# Patient Record
Sex: FEMALE | Race: WHITE | NOT HISPANIC OR LATINO | Employment: OTHER | URBAN - METROPOLITAN AREA
[De-identification: names, ages, dates, MRNs, and addresses within clinical notes are randomized per-mention and may not be internally consistent; named-entity substitution may affect disease eponyms.]

---

## 2017-01-04 ENCOUNTER — OFFICE VISIT (OUTPATIENT)
Dept: UROGYNECOLOGY | Facility: CLINIC | Age: 82
End: 2017-01-04
Payer: MEDICARE

## 2017-01-04 ENCOUNTER — LAB VISIT (OUTPATIENT)
Dept: LAB | Facility: OTHER | Age: 82
End: 2017-01-04
Attending: OBSTETRICS & GYNECOLOGY
Payer: MEDICARE

## 2017-01-04 VITALS
BODY MASS INDEX: 26.71 KG/M2 | DIASTOLIC BLOOD PRESSURE: 60 MMHG | WEIGHT: 132.5 LBS | SYSTOLIC BLOOD PRESSURE: 142 MMHG | HEIGHT: 59 IN | RESPIRATION RATE: 18 BRPM | HEART RATE: 78 BPM

## 2017-01-04 DIAGNOSIS — N95.2 VAGINAL ATROPHY: ICD-10-CM

## 2017-01-04 DIAGNOSIS — N39.46 URINARY INCONTINENCE, MIXED: ICD-10-CM

## 2017-01-04 DIAGNOSIS — E03.9 ACQUIRED HYPOTHYROIDISM: Primary | ICD-10-CM

## 2017-01-04 DIAGNOSIS — R35.1 NOCTURIA MORE THAN TWICE PER NIGHT: ICD-10-CM

## 2017-01-04 DIAGNOSIS — E03.9 ACQUIRED HYPOTHYROIDISM: ICD-10-CM

## 2017-01-04 DIAGNOSIS — R26.89 BALANCE DISORDER: ICD-10-CM

## 2017-01-04 DIAGNOSIS — N99.3 VAGINAL VAULT PROLAPSE, POSTHYSTERECTOMY: ICD-10-CM

## 2017-01-04 LAB
T3FREE SERPL-MCNC: 2.8 PG/ML
T4 FREE SERPL-MCNC: 0.83 NG/DL
TSH SERPL DL<=0.005 MIU/L-ACNC: 0.09 UIU/ML

## 2017-01-04 PROCEDURE — 1159F MED LIST DOCD IN RCRD: CPT | Mod: S$GLB,,, | Performed by: OBSTETRICS & GYNECOLOGY

## 2017-01-04 PROCEDURE — 1160F RVW MEDS BY RX/DR IN RCRD: CPT | Mod: S$GLB,,, | Performed by: OBSTETRICS & GYNECOLOGY

## 2017-01-04 PROCEDURE — 84443 ASSAY THYROID STIM HORMONE: CPT

## 2017-01-04 PROCEDURE — 99213 OFFICE O/P EST LOW 20 MIN: CPT | Mod: S$GLB,,, | Performed by: OBSTETRICS & GYNECOLOGY

## 2017-01-04 PROCEDURE — 84481 FREE ASSAY (FT-3): CPT

## 2017-01-04 PROCEDURE — 99999 PR PBB SHADOW E&M-EST. PATIENT-LVL III: CPT | Mod: PBBFAC,,, | Performed by: OBSTETRICS & GYNECOLOGY

## 2017-01-04 PROCEDURE — 84439 ASSAY OF FREE THYROXINE: CPT

## 2017-01-04 PROCEDURE — 1157F ADVNC CARE PLAN IN RCRD: CPT | Mod: S$GLB,,, | Performed by: OBSTETRICS & GYNECOLOGY

## 2017-01-04 PROCEDURE — 1126F AMNT PAIN NOTED NONE PRSNT: CPT | Mod: S$GLB,,, | Performed by: OBSTETRICS & GYNECOLOGY

## 2017-01-04 PROCEDURE — 36415 COLL VENOUS BLD VENIPUNCTURE: CPT

## 2017-01-04 NOTE — PATIENT INSTRUCTIONS
1) Vaginal dryness: Use REPLENS or REFRESH. Insert 1/2 applicator full into vagina at least twice a week. Also apply small amount to vaginal opening, farnaz back side near rectum. Before next change, use replens/rephresh/or coconut oil EVERY night x 1 week to improve tissue pliability for pessary check.    2) Weight loss: Use lidocaine 2% gel before next pessary change. #5 ring + support. Please  (Majoria) and bring lidocaine gel with you to all visits.   3) Nocturia (nighttime urination):   --continue Kegel exercises  --stop fluids 2 hours before bedtime; minimize fluids consumed during night (only keep small water by bed for pills)  --elevation of feet above heart x1 hour before bed   --use of support hose during daytime   --drink any alcohol by 7 PM  --Benadryl QHS PRN   --given list of dietary irritants to consider   --continue thyroid supplement  4) Well-woman care:   --pap:  Post-hyst.  Denies history of abnormal Pap smear. Last Pap smear, 2009, negative. No further needed.   --MMG: The patient has never had a mammogram. She continues to refuse and understands the risks of doing so.   --colonoscopy:  She does not desire annual screening including colonoscopy. She understands the risks of doing so.    --DEXA: unknown date  --follow up with PCP on JONO Daley  5)  Constipation:  --continue thyroid supplement  --start daily gummy fibers + 1 stool softener/day; continue daily prunes  --hydrate well  --continue magnesium oxide daily (400 IU daily or through other supplements)  --fiber can help reduce blood lipid levels  6)  Pelvic organ prolapse (cystocele/rectocele):  --continue with pessary use   7)   Thyroid disorder:  --TSH, T4, T3  --endocrine consult  8)   Worsening balance issues, h/o seizures:  --neurology consult  9)  RTC 3 months.

## 2017-01-04 NOTE — PROGRESS NOTES
"CC: pelvic organ prolapse, mild stress incontinence    HPI: 81 y/o WDWN, ELMO with history of vaginal prolapse presents for pessary cleaning. She has worn the pessary for past year without complaints. She reports that the pessary fits much better since her last visit. She denies difficulty with comfort, voiding, or defecating. She reports no vaginal discharge or bleeding with pessary. States no difficulty emptying bladder. Voids 6/day, nocturia 3-4/night, urgency if waits too long to void1-2/day. Wears 1-2 pads/day. States occasional leakage with cough/sneeze- has stopped. Denies pain with urination. States occasional constipation, improved since last visit. She is taking a new supplement for the last 2-3 months for "low thyroid" which she feels like may be helping with constipation. She reports a history of "low thyroid" diagnosis many years ago. She used to take medication for this, but reports she stopped the medication as it did not seem to help. She reports increased hair loss recently. She reports feeling tired/fatigued prior to starting these supplements. She also reports intolerance to cold. Has not had labs recently. Reports that increased fiber in diet has helped with constipation as well. She does not use stool softener.     2008:   PROCEDURES PERFORMED:   1. Perigee procedure (attached to obturator and ischial spine).    2. Anterior repair with graft, Prolift propylene and the MiniArc.   3. Cystoscopy.       1)  BRAD:  Not bad currently.  Mostly bladder pressure at night--feels sense of urgency.  Also notes some urgency during day, farnaz when out.  3-4x/PM.  In day, can hold urine ok.  +complete emptying.  No dysuria. Nocturia: 3x/PM.    2)  Vaginal atrophy: not using replens 2x./week on a regular basis  3)  POP:  Pessary ok.  No vb, discharge, bleeding.   4)  BM: +constipation.  Sprinkles fiber on food/gummies erratically.      ----------------------------    Was seeing Dr. Orellana for pessary changes " "(geographic convenience). Last visit 4/13:  Lena Driscoll is a 81 y.o. female who presents for evaluation of a vaginal vault prolapse following hysterectomy. Problem started several years ago. Symptoms include: prolapse of tissue with straining and urinary incontinence: moderate. Symptoms have stabilized with use of #3 ring pessary with support. Unfortunately the pessary has recently been coming out when she strains to defecate and she needs to be refitted, but she has trouble tolerating a larger size pessary.  PLAN: Discussed pessary and Refitted with #2 Ring with Knob and with support.  Advised on stool softeners, avoiding constipation and avoiding straining.    -------------------------------------------    Spoke with patient. Saw Dr. Orellana for her pessary check. Was informed that her pessary was rubbing. At my last visit 4/15:  --Weight loss: Use lidocaine 2% gel before next pessary change. Trial of new, smaller pessary size (#4 ring + support). Was using #5 ring + support.  --changed to #3 by Reyna on 11/15  --patient states that the pessary slipped out several times  --was then changed to #2 ring + knob at last 4/16 visit; patient states that this was very uncomfortable and had to be removed immediately  PSH:  2008:   PROCEDURES PERFORMED:   1. Perigee procedure (attached to obturator and ischial spine).   2. Anterior repair with graft, Prolift propylene and the MiniArc.   3. Cystoscopy.   (anterior mesh only on review of op notes)  Currently reports a "piece of flesh" the size of a golfball that is hanging out of the vagina. Only really present with prolonged standing. Has has had some brown spotting on pad x 2 weeks, not heavy, not feculent. No pain. Has some UI with cough.   She has 1 PM appt with us 5/13 Fri at [facility] for UDS and 230 clinic appt. Advised her to just keep 230 PM appt. Will evaluate POP to see what OR procedures would work, and see if another pessary will help. Could consider office " cystometry if needed. She is ok with this plan.    TODAY:  1) Vaginal dryness: Using REPLENS or REFRESH, sometimes coconut oil, 1-2 times/week.   2) Weight loss: Use lidocaine 2% gel before next pessary change. Trial of new, smaller pessary size (#4 ring + support).  Was using #5 ring + support.  3) Nocturia (nighttime urination):   --started thyroid supplement (not Rx) that has helped her nocturia:  Now freq max 2x/PM.  Normal daytime frequency.    --has improved a little  --continues Kegel exercises  --stop fluids 2 hours before bedtime; minimize fluids consumed during night (only keep small water by bed for pills)  --elevation of feet above heart x1 hour before bed   --use of support hose during daytime   --drink any alcohol by 7 PM  --Benadryl QHS PRN   --given list of dietary irritants to consider   4) Well-woman care:   --pap:  Post-hyst.  Denies history of abnormal Pap smear. Last Pap smear, 2009, negative. No further needed.   --MMG: The patient has never had a mammogram. She continues to refuse and understands the risks of doing so.   --colonoscopy:  She does not desire annual screening including colonoscopy. She understands the risks of doing so.    --DEXA: unknown date  --follow up with PCP on JONO Daley  5)  Constipation:  --was taking coconut oil and gummy fiber--not taking regularly anymore; started + prunes  --had major bout of constipation x 1; took laxative with large, loose stool   --has hypothyroidism, which is ? Controlled  --taking thyroid formula, which is helping urinary urgency; one of the formulas has ? fiber  --taking magnesium every night  --fiber can help reduce blood lipid levels  6)  Pelvic organ prolapse (cystocele/rectocele):  --The patient was fit with #4 UI pessary--fell out with minimal maneuvers.  Fit with #3 ring + support--shifted too much toward/at introitus with valsalva/standing but staying in place with straining on toilet.  Refit with #4 ring + support, which seems to fit  "well.  No shifting with standing.   She was able to tolerate the device comfortabley with bending, squatting, valsalva.  She was not able to demonstrate independent removal and placement.  She tolerated the procedure well.    --no VB, discharge, pain otherwise  --UI is better--only has to wear small pad (stays fairly dry)      Review of patient's allergies indicates:   Allergen Reactions    Msg [glutamic acid hcl] Other (See Comments)     SEIZURE    Codeine Other (See Comments)     Unknown reaction    Penicillins Other (See Comments)     Unknown reaction     Past Surgical History   Procedure Laterality Date    Hysterectomy      Tonsillectomy, adenoidectomy      Bladder suspension       Current Outpatient Prescriptions on File Prior to Visit   Medication Sig Dispense Refill    lidocaine HCL 2% (XYLOCAINE) 2 % jelly Apply topically as needed. Bring tube with you to pessary check.  May use 2 g vaginally x 1 several hours after pessary check if needed. 30 mL 6    multivitamin with minerals tablet Take by mouth Daily. 1 Tablet Oral Every day      thyroid, pork, (ARMOUR THYROID) 60 mg Tab Take by mouth Daily. 3 Tablet Oral Every day      methylPREDNISolone (MEDROL DOSEPACK) 4 mg tablet        No current facility-administered medications on file prior to visit.      EXAM:  Visit Vitals    BP (!) 142/60 (BP Location: Left arm, Patient Position: Sitting, BP Method: Manual)    Pulse 78    Resp 18    Ht 4' 11" (1.499 m)    Wt 60.1 kg (132 lb 7.9 oz)    BMI 26.76 kg/m2       General: WDWN, WF   Abdomen: soft, nontender, no masses   MS: no edema   Pelvic:   Ext Gen: atrophic, symmetrical, no lesions   Meatus: normal, medium size caruncle noted   Urethra: nontender, well supported   2% lidocaine gel inserted vaginally for comfort with cleaning.   Vagina: atrophic, no lesions  Cervix, uterus: absent  BME: no masses  Bladder: nontender, adequate support   PFM tone: 1/5, weak   Adnexa: nontender, no masses     Aa/Ba: " +3, Ap/Bp -1, C -4, GH 5, PB2, TVL 8-9.      #5 pessary in place without issue.  Cleaned and replaced.        Assessment:  1. Acquired hypothyroidism  TSH    T4, free    T3, FREE    Ambulatory consult to Endocrinology   2. Balance disorder  Ambulatory consult to Neurology   3. Vaginal vault prolapse, posthysterectomy     4. Vaginal atrophy     5. Nocturia more than twice per night     6. Urinary incontinence, mixed       Plan:  1) Vaginal dryness: Use REPLENS or REFRESH. Insert 1/2 applicator full into vagina at least twice a week. Also apply small amount to vaginal opening, farnaz back side near rectum. Before next change, use replens/rephresh/or coconut oil EVERY night x 1 week to improve tissue pliability for pessary check.    2) Weight loss: Use lidocaine 2% gel before next pessary change. #5 ring + support. Please  (Majoria) and bring lidocaine gel with you to all visits.   3) Nocturia (nighttime urination):   --continue Kegel exercises  --stop fluids 2 hours before bedtime; minimize fluids consumed during night (only keep small water by bed for pills)  --elevation of feet above heart x1 hour before bed   --use of support hose during daytime   --drink any alcohol by 7 PM  --Benadryl QHS PRN   --given list of dietary irritants to consider   --continue thyroid supplement  4) Well-woman care:   --pap:  Post-hyst.  Denies history of abnormal Pap smear. Last Pap smear, 2009, negative. No further needed.   --MMG: The patient has never had a mammogram. She continues to refuse and understands the risks of doing so.   --colonoscopy:  She does not desire annual screening including colonoscopy. She understands the risks of doing so.    --DEXA: unknown date  --follow up with PCP on JONO Daley  5)  Constipation:  --continue thyroid supplement  --start daily gummy fibers + 1 stool softener/day; continue daily prunes  --hydrate well  --continue magnesium oxide daily (400 IU daily or through other supplements)  --fiber can  help reduce blood lipid levels  6)  Pelvic organ prolapse (cystocele/rectocele):  --continue with pessary use   7)   Thyroid disorder:  --TSH, T4, T3  --endocrine consult  8)   Worsening balance issues, h/o seizures:  --neurology consult  9)  RTC 3 months.      Approx 30 min spent in consult, 50% in discussion.

## 2017-01-04 NOTE — MR AVS SNAPSHOT
[facility]  [address] [address]  Phone: [phone]                  Lena Driscoll   2017 1:30 PM   Office Visit    Description:  Female : 12/15/1934   Provider:  Ventura Thompson MD   Department:  [facility]           Reason for Visit     Pessary Check           Diagnoses this Visit        Comments    Acquired hypothyroidism    -  Primary     Balance disorder                To Do List           Goals (5 Years of Data)     None      [facility]     [facility] -  Assistance  Registered nurses in the [facility] provide clinical advisement, health education, appointment booking, and other advisory services.  Call for this free service at [phone].             Medications           Message regarding Medications     Verify the changes and/or additions to your medication regime listed below are the same as discussed with your clinician today.  If any of these changes or additions are incorrect, please notify your healthcare provider.             Verify that the below list of medications is an accurate representation of the medications you are currently taking.  If none reported, the list may be blank. If incorrect, please contact your healthcare provider. Carry this list with you in case of emergency.           Current Medications     lidocaine HCL 2% (XYLOCAINE) 2 % jelly Apply topically as needed. Bring tube with you to pessary check.  May use 2 g vaginally x 1 several hours after pessary check if needed.    multivitamin with minerals tablet Take by mouth Daily. 1 Tablet Oral Every day    thyroid, pork, (ARMOUR THYROID) 60 mg Tab Take by mouth Daily. 3 Tablet Oral Every day    methylPREDNISolone (MEDROL DOSEPACK) 4 mg tablet            Clinical Reference Information           Vital Signs - Last Recorded  Most recent update: 2017  2:06 PM by Liliya Marin LPN    BP Pulse Resp Ht Wt BMI    (!)  "142/60 (BP Location: Left arm, Patient Position: Sitting, BP Method: Manual) 78 18 4' 11" (1.499 m) 60.1 kg (132 lb 7.9 oz) 26.76 kg/m2      Blood Pressure          Most Recent Value    BP  (!)  142/60      Allergies as of 1/4/2017     Msg [Glutamic Acid Hcl]    Codeine    Penicillins      Immunizations Administered on Date of Encounter - 1/4/2017     None      Orders Placed During Today's Visit      Normal Orders This Visit    Ambulatory consult to Endocrinology     Ambulatory consult to Neurology     Future Labs/Procedures Expected by Expires    T3, FREE  1/4/2017 3/5/2018    T4, free  1/4/2017 3/5/2018    TSH  1/4/2017 3/5/2018      [facility] Sign-Up     Activating your [facility] account is as easy as 1-2-3!     1) Visit [email or web address], select Sign Up Now, enter this activation code and your date of birth, then select Next.  [email or web address]  Expires: 2/18/2017  3:16 PM      2) Create a username and password to use when you visit [facility] in the future and select a security question in case you lose your password and select Next.    3) Enter your e-mail address and click Sign Up!    Additional Information  If you have questions, please e-mail [email or web address] or call [phone] to talk to our [facility] staff. Remember, [facility] is NOT to be used for urgent needs. For medical emergencies, dial 911.         Instructions    1) Vaginal dryness: Use REPLENS or REFRESH. Insert 1/2 applicator full into vagina at least twice a week. Also apply small amount to vaginal opening, farnaz back side near rectum. Before next change, use replens/rephresh/or coconut oil EVERY night x 1 week to improve tissue pliability for pessary check.    2) Weight loss: Use lidocaine 2% gel before next pessary change. #5 ring + support. Please  (Majoria) and bring lidocaine gel with you to all visits.   3) Nocturia (nighttime urination):   --continue Kegel exercises  --stop fluids 2 hours before bedtime; minimize fluids consumed " during night (only keep small water by bed for pills)  --elevation of feet above heart x1 hour before bed   --use of support hose during daytime   --drink any alcohol by 7 PM  --Benadryl QHS PRN   --given list of dietary irritants to consider   --continue thyroid supplement  4) Well-woman care:   --pap:  Post-hyst.  Denies history of abnormal Pap smear. Last Pap smear, 2009, negative. No further needed.   --MMG: The patient has never had a mammogram. She continues to refuse and understands the risks of doing so.   --colonoscopy:  She does not desire annual screening including colonoscopy. She understands the risks of doing so.    --DEXA: unknown date  --follow up with PCP on JONO Daley  5)  Constipation:  --continue thyroid supplement  --start daily gummy fibers + 1 stool softener/day; continue daily prunes  --hydrate well  --continue magnesium oxide daily (400 IU daily or through other supplements)  --fiber can help reduce blood lipid levels  6)  Pelvic organ prolapse (cystocele/rectocele):  --continue with pessary use   7)   Thyroid disorder:  --TSH, T4, T3  --endocrine consult  8)   Worsening balance issues, h/o seizures:  --neurology consult  9)  RTC 3 months.

## 2017-01-05 ENCOUNTER — TELEPHONE (OUTPATIENT)
Dept: UROGYNECOLOGY | Facility: CLINIC | Age: 82
End: 2017-01-05

## 2017-01-06 ENCOUNTER — TELEPHONE (OUTPATIENT)
Dept: UROGYNECOLOGY | Facility: CLINIC | Age: 82
End: 2017-01-06

## 2017-01-06 NOTE — TELEPHONE ENCOUNTER
----- Message from Ventura Thompson MD sent at 1/5/2017  8:54 AM CST -----  Let patient know TSH low and T4/T3 borderline low.  Needs to see endocrine soon,  Would like [email or web address].    Can you please call patient and help her make appt with endocrine on [facility] soon.  Can be NP/PA if MD not available soon. Thanks!

## 2017-01-06 NOTE — TELEPHONE ENCOUNTER
Spoke to pt and relayed her results pt was scheduled to Dr. La's NP on 1/24/17. Pt voiced understanding and call ended.

## 2017-01-08 PROBLEM — E03.9 ACQUIRED HYPOTHYROIDISM: Status: ACTIVE | Noted: 2017-01-08

## 2017-01-08 PROBLEM — R26.89 BALANCE DISORDER: Status: ACTIVE | Noted: 2017-01-08

## 2017-04-19 ENCOUNTER — OFFICE VISIT (OUTPATIENT)
Dept: UROGYNECOLOGY | Facility: CLINIC | Age: 82
End: 2017-04-19
Payer: MEDICARE

## 2017-04-19 VITALS
WEIGHT: 136 LBS | DIASTOLIC BLOOD PRESSURE: 70 MMHG | SYSTOLIC BLOOD PRESSURE: 120 MMHG | HEIGHT: 60 IN | BODY MASS INDEX: 26.7 KG/M2

## 2017-04-19 DIAGNOSIS — K59.09 CHRONIC CONSTIPATION: ICD-10-CM

## 2017-04-19 DIAGNOSIS — N95.2 VAGINAL ATROPHY: ICD-10-CM

## 2017-04-19 DIAGNOSIS — E03.9 ACQUIRED HYPOTHYROIDISM: ICD-10-CM

## 2017-04-19 DIAGNOSIS — R26.89 BALANCE DISORDER: ICD-10-CM

## 2017-04-19 DIAGNOSIS — R35.1 NOCTURIA: Primary | ICD-10-CM

## 2017-04-19 DIAGNOSIS — N99.3 VAGINAL VAULT PROLAPSE, POSTHYSTERECTOMY: ICD-10-CM

## 2017-04-19 DIAGNOSIS — N39.46 URINARY INCONTINENCE, MIXED: ICD-10-CM

## 2017-04-19 PROCEDURE — 99213 OFFICE O/P EST LOW 20 MIN: CPT | Mod: S$GLB,,, | Performed by: OBSTETRICS & GYNECOLOGY

## 2017-04-19 PROCEDURE — 1160F RVW MEDS BY RX/DR IN RCRD: CPT | Mod: S$GLB,,, | Performed by: OBSTETRICS & GYNECOLOGY

## 2017-04-19 PROCEDURE — 1126F AMNT PAIN NOTED NONE PRSNT: CPT | Mod: S$GLB,,, | Performed by: OBSTETRICS & GYNECOLOGY

## 2017-04-19 PROCEDURE — 99999 PR PBB SHADOW E&M-EST. PATIENT-LVL III: CPT | Mod: PBBFAC,,, | Performed by: OBSTETRICS & GYNECOLOGY

## 2017-04-19 PROCEDURE — 1159F MED LIST DOCD IN RCRD: CPT | Mod: S$GLB,,, | Performed by: OBSTETRICS & GYNECOLOGY

## 2017-04-19 RX ORDER — LEVOTHYROXINE SODIUM 25 UG/1
TABLET ORAL
Status: ON HOLD | COMMUNITY
Start: 2017-04-14 | End: 2019-08-02 | Stop reason: HOSPADM

## 2017-04-19 NOTE — PATIENT INSTRUCTIONS
1) Vaginal dryness: Use REPLENS or REFRESH. Insert 1/2 applicator full into vagina at least twice a week. Also apply small amount to vaginal opening, farnaz back side near rectum. Before next change, use replens/rephresh/or coconut oil EVERY night x 1 week to improve tissue pliability for pessary check.    2) Weight loss: Use lidocaine 2% gel before next pessary change. #5 ring + support. Please  (Majoria) and bring lidocaine gel with you to all visits.   3) Nocturia (nighttime urination):   --start mirabegron 50 mg daily; let us know if too expensive  --continue Kegel exercises  --stop fluids 2 hours before bedtime; minimize fluids consumed during night (only keep small water by bed for pills)  --elevation of feet above heart x1 hour before bed   --use of support hose during daytime   --drink any alcohol by 7 PM  --Benadryl QHS PRN   --given list of dietary irritants to consider   --continue thyroid supplement  4) Well-woman care:   --pap:  Post-hyst.  Denies history of abnormal Pap smear. Last Pap smear, 2009, negative. No further needed.   --MMG: The patient has never had a mammogram. She continues to refuse and understands the risks of doing so.   --colonoscopy:  She does not desire annual screening including colonoscopy. She understands the risks of doing so.    --DEXA: unknown date  --follow up with PCP on JONO Daley  5)  Constipation:  --continue thyroid supplement  --continue daily gummy fibers + 1 stool softener/day; continue high fiber diet  --hydrate well  --continue magnesium oxide daily (400 IU daily or through other supplements)  --fiber can help reduce blood lipid levels  6)  Pelvic organ prolapse (cystocele/rectocele):  --continue with pessary use   7)   Thyroid disorder:  --follow up with endocrine  8)   Worsening balance issues, h/o seizures:  --neurology consult--please call and make appt  9)  RTC 3 months.

## 2017-04-19 NOTE — MR AVS SNAPSHOT
[facility]  [address] [address]  Phone: [phone]                  Lena Driscoll   2017 3:00 PM   Office Visit    Description:  Female : 12/15/1934   Provider:  Ventura Thompson MD   Department:  [facility]           Reason for Visit     Pessary Check           Diagnoses this Visit        Comments    Nocturia    -  Primary            To Do List           Goals (5 Years of Data)     None       These Medications        Disp Refills Start End    mirabegron 50 mg Tb24 30 tablet 11 2017    Take 1 tablet (50 mg total) by mouth once daily. - Oral    Pharmacy: [facility] [place] [place] [place], [address] Ph #: [phone]         [facility]     [facility] Care [facility] -  Assistance  Unless otherwise directed by your provider, please contact [facility], our nurse care line that is available for  assistance.     Registered nurses in the [facility] provide: appointment scheduling, clinical advisement, health education, and other advisory services.  Call: [phone] (toll free)               Medications           Message regarding Medications     Verify the changes and/or additions to your medication regime listed below are the same as discussed with your clinician today.  If any of these changes or additions are incorrect, please notify your healthcare provider.        START taking these NEW medications        Refills    mirabegron 50 mg Tb24 11    Sig: Take 1 tablet (50 mg total) by mouth once daily.    Class: Normal    Route: Oral           Verify that the below list of medications is an accurate representation of the medications you are currently taking.  If none reported, the list may be blank. If incorrect, please contact your healthcare provider. Carry this list with you in case of emergency.           Current Medications     lidocaine HCL 2% (XYLOCAINE) 2 % jelly  Apply topically as needed. Bring tube with you to pessary check.  May use 2 g vaginally x 1 several hours after pessary check if needed.    methylPREDNISolone (MEDROL DOSEPACK) 4 mg tablet     multivitamin with minerals tablet Take by mouth Daily. 1 Tablet Oral Every day    SYNTHROID 25 mcg tablet     thyroid, pork, (ARMOUR THYROID) 60 mg Tab Take by mouth Daily. 3 Tablet Oral Every day    mirabegron 50 mg Tb24 Take 1 tablet (50 mg total) by mouth once daily.           Clinical Reference Information           Your Vitals Were     BP Height Weight BMI       120/70 (BP Location: Right arm, Patient Position: Sitting) 5' (1.524 m) 61.7 kg (136 lb 0.4 oz) 26.57 kg/m2       Blood Pressure          Most Recent Value    BP  120/70      Allergies as of 4/19/2017     Msg [Glutamic Acid Hcl]    Codeine    Penicillins      Immunizations Administered on Date of Encounter - 4/19/2017     None      [facility] Sign-Up     Activating your [facility] account is as easy as 1-2-3!     1) Visit [email or web address], select Sign Up Now, enter this activation code and your date of birth, then select Next.  [email or web address]  Expires: 6/3/2017  4:42 PM      2) Create a username and password to use when you visit [facility] in the future and select a security question in case you lose your password and select Next.    3) Enter your e-mail address and click Sign Up!    Additional Information  If you have questions, please e-mail [email or web address] or call [phone] to talk to our [facility] staff. Remember, [facility] is NOT to be used for urgent needs. For medical emergencies, dial 911.         Instructions    1) Vaginal dryness: Use REPLENS or REFRESH. Insert 1/2 applicator full into vagina at least twice a week. Also apply small amount to vaginal opening, farnaz back side near rectum. Before next change, use replens/rephresh/or coconut oil EVERY night x 1 week to improve tissue pliability for pessary check.    2) Weight loss: Use lidocaine 2% gel  before next pessary change. #5 ring + support. Please  ([facility]) and bring lidocaine gel with you to all visits.   3) Nocturia (nighttime urination):   --start mirabegron 50 mg daily; let us know if too expensive  --continue Kegel exercises  --stop fluids 2 hours before bedtime; minimize fluids consumed during night (only keep small water by bed for pills)  --elevation of feet above heart x1 hour before bed   --use of support hose during daytime   --drink any alcohol by 7 PM  --Benadryl QHS PRN   --given list of dietary irritants to consider   --continue thyroid supplement  4) Well-woman care:   --pap:  Post-hyst.  Denies history of abnormal Pap smear. Last Pap smear, 2009, negative. No further needed.   --MMG: The patient has never had a mammogram. She continues to refuse and understands the risks of doing so.   --colonoscopy:  She does not desire annual screening including colonoscopy. She understands the risks of doing so.    --DEXA: unknown date  --follow up with PCP on JONO Dlaey  5)  Constipation:  --continue thyroid supplement  --continue daily gummy fibers + 1 stool softener/day; continue high fiber diet  --hydrate well  --continue magnesium oxide daily (400 IU daily or through other supplements)  --fiber can help reduce blood lipid levels  6)  Pelvic organ prolapse (cystocele/rectocele):  --continue with pessary use   7)   Thyroid disorder:  --follow up with endocrine  8)   Worsening balance issues, h/o seizures:  --neurology consult--please call and make appt  9)  RTC 3 months.         Language Assistance Services     ATTENTION: Language assistance services are available, free of charge. Please call [phone].      ATENCIÓN: Si habla español, tiene a mcleod disposición servicios gratuitos de asistencia lingüística. Llame al [phone].     ANNIA Ý: N?u b?n nói Ti?ng Vi?t, có các d?ch v? h? tr? ngôn ng? mi?n phí dành cho b?n. G?i s? [phone].         [facility] complies with  applicable Federal civil rights laws and does not discriminate on the basis of race, color, national origin, age, disability, or sex.

## 2017-04-19 NOTE — PROGRESS NOTES
"CC: pelvic organ prolapse, mild stress incontinence    HPI: 81 y/o WDWN, ELMO with history of vaginal prolapse presents for pessary cleaning. She has worn the pessary for past year without complaints. She reports that the pessary fits much better since her last visit. She denies difficulty with comfort, voiding, or defecating. She reports no vaginal discharge or bleeding with pessary. States no difficulty emptying bladder. Voids 6/day, nocturia 3-4/night, urgency if waits too long to void1-2/day. Wears 1-2 pads/day. States occasional leakage with cough/sneeze- has stopped. Denies pain with urination. States occasional constipation, improved since last visit. She is taking a new supplement for the last 2-3 months for "low thyroid" which she feels like may be helping with constipation. She reports a history of "low thyroid" diagnosis many years ago. She used to take medication for this, but reports she stopped the medication as it did not seem to help. She reports increased hair loss recently. She reports feeling tired/fatigued prior to starting these supplements. She also reports intolerance to cold. Has not had labs recently. Reports that increased fiber in diet has helped with constipation as well. She does not use stool softener.     2008:   PROCEDURES PERFORMED:   1. Perigee procedure (attached to obturator and ischial spine).    2. Anterior repair with graft, Prolift propylene and the MiniArc.   3. Cystoscopy.       1)  BRAD:  Not bad currently.  Mostly bladder pressure at night--feels sense of urgency.  Also notes some urgency during day, farnaz when out.  3-4x/PM.  In day, can hold urine ok.  +complete emptying.  No dysuria. Nocturia: 3x/PM.    2)  Vaginal atrophy: not using replens 2x./week on a regular basis  3)  POP:  Pessary ok.  No vb, discharge, bleeding.   4)  BM: +constipation.  Sprinkles fiber on food/gummies erratically.      ----------------------------    Was seeing Dr. Orellana for pessary changes " "(geographic convenience). Last visit 4/13:  Lena Driscoll is a 81 y.o. female who presents for evaluation of a vaginal vault prolapse following hysterectomy. Problem started several years ago. Symptoms include: prolapse of tissue with straining and urinary incontinence: moderate. Symptoms have stabilized with use of #3 ring pessary with support. Unfortunately the pessary has recently been coming out when she strains to defecate and she needs to be refitted, but she has trouble tolerating a larger size pessary.  PLAN: Discussed pessary and Refitted with #2 Ring with Knob and with support.  Advised on stool softeners, avoiding constipation and avoiding straining.    -------------------------------------------    Spoke with patient. Saw Dr. Orellana for her pessary check. Was informed that her pessary was rubbing. At my last visit 4/15:  --Weight loss: Use lidocaine 2% gel before next pessary change. Trial of new, smaller pessary size (#4 ring + support). Was using #5 ring + support.  --changed to #3 by Reyna on 11/15  --patient states that the pessary slipped out several times  --was then changed to #2 ring + knob at last 4/16 visit; patient states that this was very uncomfortable and had to be removed immediately  PSH:  2008:   PROCEDURES PERFORMED:   1. Perigee procedure (attached to obturator and ischial spine).   2. Anterior repair with graft, Prolift propylene and the MiniArc.   3. Cystoscopy.   (anterior mesh only on review of op notes)  Currently reports a "piece of flesh" the size of a golfball that is hanging out of the vagina. Only really present with prolonged standing. Has has had some brown spotting on pad x 2 weeks, not heavy, not feculent. No pain. Has some UI with cough.   She has 1 PM appt with us 5/13 Fri at [facility] for UDS and 230 clinic appt. Advised her to just keep 230 PM appt. Will evaluate POP to see what OR procedures would work, and see if another pessary will help. Could consider office " cystometry if needed. She is ok with this plan.    TODAY:  1) Vaginal dryness: was using REPLENS or REFRESH, sometimes coconut oil, 1-2 times/week. Forgets.    2) Weight loss: Use lidocaine 2% gel before next pessary change. Trial of new, smaller pessary size (#4 ring + support).  Was using #5 ring + support.  3) Nocturia (nighttime urination):   --improved for a little while; has tried to decrease triggers later in day; Now freq max 3-4x/PM.  Normal daytime frequency.  Stopped fluids 2 hours before bed.  No water by bed.   --continues Kegel exercises  --stop fluids 2 hours before bedtime; minimize fluids consumed during night (only keep small water by bed for pills)  --elevation of feet above heart x1 hour before bed   --use of support hose during daytime   --drink any alcohol by 7 PM  --Benadryl QHS PRN   --given list of dietary irritants to consider   4) Well-woman care:   --pap:  Post-hyst.  Denies history of abnormal Pap smear. Last Pap smear, 2009, negative. No further needed.   --MMG: The patient has never had a mammogram. She continues to refuse and understands the risks of doing so.   --colonoscopy:  She does not desire annual screening including colonoscopy. She understands the risks of doing so.    --DEXA: unknown date  --follow up with PCP on JONO Daley  5)  Constipation:  --has increased fiber dose, which helps; has trouble remembering to take daily  --no major bouts of constipation  --has hypothyroidism--recently started seeing endocrinologists  --taking magnesium every night  6)  Pelvic organ prolapse (cystocele/rectocele):  --The patient was fit with #4 UI pessary--fell out with minimal maneuvers.  Fit with #3 ring + support--shifted too much toward/at introitus with valsalva/standing but staying in place with straining on toilet.  Refit with #4 ring + support, which seems to fit well.  No shifting with standing.   She was able to tolerate the device comfortabley with bending, squatting, valsalva.  She  was not able to demonstrate independent removal and placement.  She tolerated the procedure well.    --no VB, discharge, pain otherwise  --UI is better--only has to wear small pad (stays fairly dry)  7)  Thyroid disorder:  --saw endocrine on [address]--was restarted on low dose of synthroid (25 mcg)--now on 2nd month  --has noticed she's sleeping better  --also discussed preDM; has tightened carb control  8)  Worsening balance issues, h/o seizures:  --did not see neurologist yet      Review of patient's allergies indicates:   Allergen Reactions    Msg [glutamic acid hcl] Other (See Comments)     SEIZURE    Codeine Other (See Comments)     Unknown reaction    Penicillins Other (See Comments)     Unknown reaction     Past Surgical History:   Procedure Laterality Date    BLADDER SUSPENSION      HYSTERECTOMY      TONSILLECTOMY, ADENOIDECTOMY       Current Outpatient Prescriptions on File Prior to Visit   Medication Sig Dispense Refill    lidocaine HCL 2% (XYLOCAINE) 2 % jelly Apply topically as needed. Bring tube with you to pessary check.  May use 2 g vaginally x 1 several hours after pessary check if needed. 30 mL 6    methylPREDNISolone (MEDROL DOSEPACK) 4 mg tablet       multivitamin with minerals tablet Take by mouth Daily. 1 Tablet Oral Every day      thyroid, pork, (ARMOUR THYROID) 60 mg Tab Take by mouth Daily. 3 Tablet Oral Every day       No current facility-administered medications on file prior to visit.      EXAM:  /70 (BP Location: Right arm, Patient Position: Sitting)  Ht 5' (1.524 m)  Wt 61.7 kg (136 lb 0.4 oz)  BMI 26.57 kg/m2    General: WDWN, WF   Abdomen: soft, nontender, no masses   MS: no edema   Pelvic:   Ext Gen: atrophic, symmetrical, no lesions   Meatus: normal, medium size caruncle noted   Urethra: nontender, well supported   2% lidocaine gel inserted vaginally for comfort with cleaning.   Vagina: atrophic, no lesions.  Small granulation tissue--AgNO3 applied.    Cervix,  uterus: absent  BME: no masses  Bladder: nontender, adequate support   PFM tone: 1/5, weak   Adnexa: nontender, no masses     Aa/Ba: +3, Ap/Bp -1, C -4, GH 5, PB2, TVL 8-9.      #5 pessary in place without issue.  Cleaned and replaced.  Lidocaine gel applied before removal.    Assessment:  1. Nocturia  mirabegron 50 mg Tb24   2. Vaginal vault prolapse, posthysterectomy     3. Vaginal atrophy     4. Urinary incontinence, mixed     5. Acquired hypothyroidism     6. Chronic constipation     7. Balance disorder       Plan:  1) Vaginal dryness: Use REPLENS or REFRESH. Insert 1/2 applicator full into vagina at least twice a week. Also apply small amount to vaginal opening, farnaz back side near rectum. Before next change, use replens/rephresh/or coconut oil EVERY night x 1 week to improve tissue pliability for pessary check.    2) Weight loss: Use lidocaine 2% gel before next pessary change. #5 ring + support. Please  ([place]) and bring lidocaine gel with you to all visits.   3) Nocturia (nighttime urination):   --start mirabegron 50 mg daily; let us know if too expensive  --continue Kegel exercises  --stop fluids 2 hours before bedtime; minimize fluids consumed during night (only keep small water by bed for pills)  --elevation of feet above heart x1 hour before bed   --use of support hose during daytime   --drink any alcohol by 7 PM  --Benadryl QHS PRN   --given list of dietary irritants to consider   --continue thyroid supplement  4) Well-woman care:   --pap:  Post-hyst.  Denies history of abnormal Pap smear. Last Pap smear, 2009, negative. No further needed.   --MMG: The patient has never had a mammogram. She continues to refuse and understands the risks of doing so.   --colonoscopy:  She does not desire annual screening including colonoscopy. She understands the risks of doing so.    --DEXA: unknown date  --follow up with PCP on JONO Daley  5)  Constipation:  --continue thyroid supplement  --continue daily gummy  fibers + 1 stool softener/day; continue high fiber diet  --hydrate well  --continue magnesium oxide daily (400 IU daily or through other supplements)  --fiber can help reduce blood lipid levels  6)  Pelvic organ prolapse (cystocele/rectocele):  --continue with pessary use   7)   Thyroid disorder:  --follow up with endocrine  8)   Worsening balance issues, h/o seizures:  --neurology consult--please call and make appt  9)  RTC 3 months.      Approx 30 min spent in consult, 50% in discussion.

## 2017-04-30 PROBLEM — R35.1 NOCTURIA: Status: ACTIVE | Noted: 2017-04-30

## 2017-07-03 ENCOUNTER — TELEPHONE (OUTPATIENT)
Dept: UROGYNECOLOGY | Facility: CLINIC | Age: 82
End: 2017-07-03

## 2017-07-03 NOTE — TELEPHONE ENCOUNTER
----- Message from Reyna Thompson sent at 7/3/2017  2:31 PM CDT -----  _X  1st Request  _  2nd Request  _  3rd Request        Who: SATISH MURILLO [[id number]]    Why: Pt would like reschedule her appt with Dr. Thompson for some time in August around 2:30pm. Please call to reschedule.    What Number to Call Back:[phone]    When to Expect a call back: (With in 24 hours)

## 2017-07-03 NOTE — TELEPHONE ENCOUNTER
Spoke to pt and R/S her appointment to 08/16 and informed her I'd send her appointment letter in the mail. Pt voiced understanding and call ended.

## 2017-07-24 ENCOUNTER — HOSPITAL ENCOUNTER (OUTPATIENT)
Dept: RADIOLOGY | Facility: HOSPITAL | Age: 82
Discharge: HOME | End: 2017-07-24
Attending: FAMILY MEDICINE
Payer: MEDICARE

## 2017-07-24 DIAGNOSIS — I50.9 HEART FAILURE, UNSPECIFIED: Primary | ICD-10-CM

## 2017-07-24 DIAGNOSIS — I50.9 HEART FAILURE, UNSPECIFIED: ICD-10-CM

## 2017-07-24 PROCEDURE — 71020 XR CHEST PA AND LATERAL: CPT | Mod: 26,,, | Performed by: RADIOLOGY

## 2017-07-24 PROCEDURE — 71020 XR CHEST PA AND LATERAL: CPT | Mod: TC

## 2017-08-16 ENCOUNTER — OFFICE VISIT (OUTPATIENT)
Dept: UROGYNECOLOGY | Facility: CLINIC | Age: 82
End: 2017-08-16
Payer: MEDICARE

## 2017-08-16 VITALS
DIASTOLIC BLOOD PRESSURE: 62 MMHG | SYSTOLIC BLOOD PRESSURE: 100 MMHG | WEIGHT: 130.75 LBS | BODY MASS INDEX: 26.36 KG/M2 | HEIGHT: 59 IN

## 2017-08-16 DIAGNOSIS — K59.09 CHRONIC CONSTIPATION: ICD-10-CM

## 2017-08-16 DIAGNOSIS — N95.2 VAGINAL ATROPHY: ICD-10-CM

## 2017-08-16 DIAGNOSIS — R35.1 NOCTURIA MORE THAN TWICE PER NIGHT: ICD-10-CM

## 2017-08-16 DIAGNOSIS — N99.3 VAGINAL VAULT PROLAPSE, POSTHYSTERECTOMY: Primary | ICD-10-CM

## 2017-08-16 DIAGNOSIS — N39.46 URINARY INCONTINENCE, MIXED: ICD-10-CM

## 2017-08-16 PROCEDURE — 1159F MED LIST DOCD IN RCRD: CPT | Mod: S$GLB,,, | Performed by: OBSTETRICS & GYNECOLOGY

## 2017-08-16 PROCEDURE — 3008F BODY MASS INDEX DOCD: CPT | Mod: S$GLB,,, | Performed by: OBSTETRICS & GYNECOLOGY

## 2017-08-16 PROCEDURE — 99999 PR PBB SHADOW E&M-EST. PATIENT-LVL III: CPT | Mod: PBBFAC,,, | Performed by: OBSTETRICS & GYNECOLOGY

## 2017-08-16 PROCEDURE — 1126F AMNT PAIN NOTED NONE PRSNT: CPT | Mod: S$GLB,,, | Performed by: OBSTETRICS & GYNECOLOGY

## 2017-08-16 PROCEDURE — 99213 OFFICE O/P EST LOW 20 MIN: CPT | Mod: S$GLB,,, | Performed by: OBSTETRICS & GYNECOLOGY

## 2017-08-16 RX ORDER — BENZONATATE 100 MG/1
CAPSULE ORAL
COMMUNITY
Start: 2017-07-19

## 2017-08-16 RX ORDER — METFORMIN HYDROCHLORIDE 500 MG/1
TABLET ORAL
COMMUNITY
Start: 2017-07-19 | End: 2019-01-17

## 2017-08-16 NOTE — PROGRESS NOTES
"CC: pelvic organ prolapse, mild stress incontinence    HPI: 83 y/o WDWN, ELMO with history of vaginal prolapse presents for pessary cleaning. She has worn the pessary for past year without complaints. She reports that the pessary fits much better since her last visit. She denies difficulty with comfort, voiding, or defecating. She reports no vaginal discharge or bleeding with pessary. States no difficulty emptying bladder. Voids 6/day, nocturia 3-4/night, urgency if waits too long to void1-2/day. Wears 1-2 pads/day. States occasional leakage with cough/sneeze- has stopped. Denies pain with urination. States occasional constipation, improved since last visit. She is taking a new supplement for the last 2-3 months for "low thyroid" which she feels like may be helping with constipation. She reports a history of "low thyroid" diagnosis many years ago. She used to take medication for this, but reports she stopped the medication as it did not seem to help. She reports increased hair loss recently. She reports feeling tired/fatigued prior to starting these supplements. She also reports intolerance to cold. Has not had labs recently. Reports that increased fiber in diet has helped with constipation as well. She does not use stool softener.     2008:   PROCEDURES PERFORMED:   1. Perigee procedure (attached to obturator and ischial spine).    2. Anterior repair with graft, Prolift propylene and the MiniArc.   3. Cystoscopy.       1)  BRAD:  Not bad currently.  Mostly bladder pressure at night--feels sense of urgency.  Also notes some urgency during day, farnaz when out.  3-4x/PM.  In day, can hold urine ok.  +complete emptying.  No dysuria. Nocturia: 3x/PM.    2)  Vaginal atrophy: not using replens 2x./week on a regular basis  3)  POP:  Pessary ok.  No vb, discharge, bleeding.   4)  BM: +constipation.  Sprinkles fiber on food/gummies erratically.      ----------------------------    Was seeing Dr. Orellana for pessary changes " "(geographic convenience). Last visit 4/13:  Lena Driscoll is a 81 y.o. female who presents for evaluation of a vaginal vault prolapse following hysterectomy. Problem started several years ago. Symptoms include: prolapse of tissue with straining and urinary incontinence: moderate. Symptoms have stabilized with use of #3 ring pessary with support. Unfortunately the pessary has recently been coming out when she strains to defecate and she needs to be refitted, but she has trouble tolerating a larger size pessary.  PLAN: Discussed pessary and Refitted with #2 Ring with Knob and with support.  Advised on stool softeners, avoiding constipation and avoiding straining.    -------------------------------------------    Spoke with patient. Saw Dr. Orellana for her pessary check. Was informed that her pessary was rubbing. At my last visit 4/15:  --Weight loss: Use lidocaine 2% gel before next pessary change. Trial of new, smaller pessary size (#4 ring + support). Was using #5 ring + support.  --changed to #3 by Reyna on 11/15  --patient states that the pessary slipped out several times  --was then changed to #2 ring + knob at last 4/16 visit; patient states that this was very uncomfortable and had to be removed immediately  PSH:  2008:   PROCEDURES PERFORMED:   1. Perigee procedure (attached to obturator and ischial spine).   2. Anterior repair with graft, Prolift propylene and the MiniArc.   3. Cystoscopy.   (anterior mesh only on review of op notes)  Currently reports a "piece of flesh" the size of a golfball that is hanging out of the vagina. Only really present with prolonged standing. Has has had some brown spotting on pad x 2 weeks, not heavy, not feculent. No pain. Has some UI with cough.   She has 1 PM appt with us 5/13 Fri at [facility] for UDS and 230 clinic appt. Advised her to just keep 230 PM appt. Will evaluate POP to see what OR procedures would work, and see if another pessary will help. Could consider office " cystometry if needed. She is ok with this plan.    TODAY:  1) Vaginal dryness: was using REPLENS or REFRESH, sometimes coconut oil, 1-2 times/week. Following blood type diet--says not to use coconut oil.    2) Weight loss: Use lidocaine 2% gel before next pessary change. Trial of new, smaller pessary size (#4 ring + support).  Was using #5 ring + support.  3) Nocturia (nighttime urination):   --intermittent; has tried to decrease triggers later in day; does still drink at night sometimes.  Now freq max 3-4x/PM.  Normal daytime frequency.     --continues Kegel exercises  --stop fluids 2 hours before bedtime; minimize fluids consumed during night (only keep small water by bed for pills)  --elevation of feet above heart x1 hour before bed   --use of support hose during daytime   --drink any alcohol by 7 PM  --Benadryl QHS PRN   --given list of dietary irritants to consider   4) Well-woman care:   --pap:  Post-hyst.  Denies history of abnormal Pap smear. Last Pap smear, 2009, negative. No further needed.   --MMG: The patient has never had a mammogram. She continues to refuse and understands the risks of doing so.   --colonoscopy:  She does not desire annual screening including colonoscopy. She understands the risks of doing so.    --DEXA: unknown date  --follow up with PCP on JONO Daley  5)  Constipation:  --has increased fiber dose, which helps; has trouble remembering to take daily  --no major bouts of constipation  --has hypothyroidism--recently started seeing endocrinologists  --taking magnesium every night  6)  Pelvic organ prolapse (cystocele/rectocele):  --The patient was fit with #4 UI pessary--fell out with minimal maneuvers.  Fit with #3 ring + support--shifted too much toward/at introitus with valsalva/standing but staying in place with straining on toilet.  Refit with #4 ring + support, which seems to fit well.  No shifting with standing.   She was able to tolerate the device comfortabley with bending,  "squatting, valsalva.  She was not able to demonstrate independent removal and placement.  She tolerated the procedure well.    --no VB, discharge, pain otherwise  --UI is better--only has to wear small pad (stays fairly dry)  7)  Thyroid disorder:  --saw endocrine on [address]--was restarted on low dose of synthroid (25 mcg)--has now weaned completely off  --has noticed she's sleeping better  --also discussed preDM; has tightened carb control  8)  Worsening balance issues, h/o seizures:  --did not see neurologist yet  9)  Recent leg swelling/pain:  --saw cards yesterday; did EKG; has plans for ECHO      Review of patient's allergies indicates:   Allergen Reactions    Msg [glutamic acid hcl] Other (See Comments)     SEIZURE    Codeine Other (See Comments)     Unknown reaction    Penicillins Other (See Comments)     Unknown reaction     Past Surgical History:   Procedure Laterality Date    BLADDER SUSPENSION      HYSTERECTOMY      TONSILLECTOMY, ADENOIDECTOMY       Current Outpatient Prescriptions on File Prior to Visit   Medication Sig Dispense Refill    multivitamin with minerals tablet Take by mouth Daily. 1 Tablet Oral Every day      thyroid, pork, (ARMOUR THYROID) 60 mg Tab Take by mouth Daily. 3 Tablet Oral Every day      lidocaine HCL 2% (XYLOCAINE) 2 % jelly Apply topically as needed. Bring tube with you to pessary check.  May use 2 g vaginally x 1 several hours after pessary check if needed. 30 mL 6    methylPREDNISolone (MEDROL DOSEPACK) 4 mg tablet       mirabegron 50 mg Tb24 Take 1 tablet (50 mg total) by mouth once daily. 30 tablet 11    SYNTHROID 25 mcg tablet        No current facility-administered medications on file prior to visit.      EXAM:  /62 (BP Location: Right arm, Patient Position: Sitting)   Ht 4' 11" (1.499 m)   Wt 59.3 kg (130 lb 11.7 oz)   BMI 26.40 kg/m²     General: WDWN, WF   Abdomen: soft, nontender, no masses   MS: no edema   Pelvic:   Ext Gen: atrophic, symmetrical, " no lesions   Meatus: normal, medium size caruncle noted   Urethra: nontender, well supported   2% lidocaine gel inserted vaginally for comfort with cleaning.   Vagina: atrophic, no lesions.  Small granulation tissue--AgNO3 applied.    Cervix, uterus: absent  BME: no masses  Bladder: nontender, adequate support   PFM tone: 1/5, weak   Adnexa: nontender, no masses     Aa/Ba: +3, Ap/Bp -1, C -4, GH 5, PB2, TVL 8-9.      #5 pessary in place without issue.  Cleaned and replaced.  Lidocaine gel applied before removal.    Assessment:  1. Vaginal vault prolapse, posthysterectomy     2. Vaginal atrophy     3. Urinary incontinence, mixed     4. Nocturia more than twice per night     5. Chronic constipation       Plan:  1) Vaginal dryness: Use REPLENS or REFRESH. Insert 1/2 applicator full into vagina at least twice a week. Also apply small amount to vaginal opening, farnaz back side near rectum. Before next change, use replens/rephresh/or extra virgin olive oil EVERY night x 1 week to improve tissue pliability for pessary check.    2) Weight loss: Use lidocaine 2% gel before next pessary change. #5 ring + support. Please  (Majoria) and bring lidocaine gel with you to all visits.   3) Nocturia (nighttime urination):   --consider mirabegron 50 mg daily--side effects are minimal  --continue Kegel exercises  --stop fluids 2 hours before bedtime; minimize fluids consumed during night (only keep small water by bed for pills)  --elevation of feet above heart x1 hour before bed   --use of support hose during daytime   --drink any alcohol by 7 PM  --Benadryl QHS PRN   --given list of dietary irritants to consider   --continue thyroid supplement  4) Well-woman care:   --pap:  Post-hyst.  Denies history of abnormal Pap smear. Last Pap smear, 2009, negative. No further needed.   --MMG: The patient has never had a mammogram. She continues to refuse and understands the risks of doing so.   --colonoscopy:  She does not desire annual  screening including colonoscopy. She understands the risks of doing so.    --DEXA: unknown date  --follow up with PCP on JONO Daley  5)  Constipation:  --follow up with endocrinologist for thyroid checks  --TAKE FIBER AND STOOL SOFTENER EVERY DAY  --EVERY DAY: take daily gummy fibers + 1 stool softener/day; continue high fiber diet  --hydrate well  --continue magnesium oxide daily (400 IU daily or through other supplements)  --fiber can help reduce blood lipid levels  6)  Pelvic organ prolapse (cystocele/rectocele):  --continue with pessary use   7)   Thyroid disorder:  --follow up with endocrine  8)   Worsening balance issues, h/o seizures:  --neurology consult--please call and make appt once cardiology evaluates  9)  Leg pain/swelling:  --follow up with cardiologist  10)  RTC 3 months.     Approx 30 min spent in consult, 50% in discussion.

## 2017-08-16 NOTE — PATIENT INSTRUCTIONS
1) Vaginal dryness: Use REPLENS or REFRESH. Insert 1/2 applicator full into vagina at least twice a week. Also apply small amount to vaginal opening, farnaz back side near rectum. Before next change, use replens/rephresh/or extra virgin olive oil EVERY night x 1 week to improve tissue pliability for pessary check.    2) Weight loss: Use lidocaine 2% gel before next pessary change. #5 ring + support. Please  (Majoria) and bring lidocaine gel with you to all visits.   3) Nocturia (nighttime urination):   --consider mirabegron 50 mg daily--side effects are minimal  --continue Kegel exercises  --stop fluids 2 hours before bedtime; minimize fluids consumed during night (only keep small water by bed for pills)  --elevation of feet above heart x1 hour before bed   --use of support hose during daytime   --drink any alcohol by 7 PM  --Benadryl QHS PRN   --given list of dietary irritants to consider   --continue thyroid supplement  4) Well-woman care:   --pap:  Post-hyst.  Denies history of abnormal Pap smear. Last Pap smear, 2009, negative. No further needed.   --MMG: The patient has never had a mammogram. She continues to refuse and understands the risks of doing so.   --colonoscopy:  She does not desire annual screening including colonoscopy. She understands the risks of doing so.    --DEXA: unknown date  --follow up with PCP on JONO Daley  5)  Constipation:  --follow up with endocrinologist for thyroid checks  --TAKE FIBER AND STOOL SOFTENER EVERY DAY  --EVERY DAY: take daily gummy fibers + 1 stool softener/day; continue high fiber diet  --hydrate well  --continue magnesium oxide daily (400 IU daily or through other supplements)  --fiber can help reduce blood lipid levels  6)  Pelvic organ prolapse (cystocele/rectocele):  --continue with pessary use   7)   Thyroid disorder:  --follow up with endocrine  8)   Worsening balance issues, h/o seizures:  --neurology consult--please call and make appt once cardiology  evaluates  9)  Leg pain/swelling:  --follow up with cardiologist  10)  RTC 3 months.

## 2017-11-06 ENCOUNTER — TELEPHONE (OUTPATIENT)
Dept: UROGYNECOLOGY | Facility: CLINIC | Age: 82
End: 2017-11-06

## 2017-11-06 NOTE — TELEPHONE ENCOUNTER
----- Message from Adrianna Fonseca sent at 11/6/2017 10:16 AM CST -----  Contact: SATISH MURILLO [[id number]]  x_  1st Request  _  2nd Request  _  3rd Request        Who: SATISH MURILLO [[id number]]    Why: patient states she would like to reschedule her appt on 11/16 to another day late in the day.     What Number to Call Back: [phone]     When to Expect a call back: (Before the end of the day)   -- if call after 3:00 call back will be tomorrow.

## 2017-11-06 NOTE — TELEPHONE ENCOUNTER
Pt states she has lung cancer and has to r/s her appointment. Pt advised to give us a call when she's feeling better to r/s her appointment and told her I'll let Dr. Thompson know what's going on. (Pt does still have her pessary in place.)

## 2017-12-28 ENCOUNTER — TELEPHONE (OUTPATIENT)
Dept: UROGYNECOLOGY | Facility: CLINIC | Age: 82
End: 2017-12-28

## 2017-12-28 NOTE — TELEPHONE ENCOUNTER
----- Message from Ventura Thompson MD sent at 12/27/2017  7:05 PM CST -----  Regarding: FW: NII  Can y'all please call patient and let her know that I've tried to call her a few times, but no answer.  Please let her know that I am aware of what she's going through, and we are all thinking of her. I am going to try to call her again in the new year to see how she's doing.  Thanks!    ----- Message -----  From: Jessica Kim  Sent: 11/6/2017  12:50 PM  To: Ventura Thompson MD  Subject: NII                                              Pt states she found out she has lung cancer and has to r/s her appointment. Pt advised to give us a call when she's feeling better to r/s her appointment and told her I'll let Dr. Thompson know what's going on. (Pt does still have her pessary in place.)

## 2018-01-05 ENCOUNTER — TELEPHONE (OUTPATIENT)
Dept: UROGYNECOLOGY | Facility: CLINIC | Age: 83
End: 2018-01-05

## 2018-01-08 ENCOUNTER — TELEPHONE (OUTPATIENT)
Dept: UROGYNECOLOGY | Facility: CLINIC | Age: 83
End: 2018-01-08

## 2018-01-08 NOTE — TELEPHONE ENCOUNTER
Relayed to pt, Dr. Thompson and staff are thinking of her, and we were checking up on her during this troubling time. Pt states she's waiting on her Oncologist to call her to do another PET scan to see if the cancer is gone and she intends on coming back when she's less stressed. I encouraged pt to take care of herself first and then follow up with us. Pt states she'd like to know what she can do in the meantime with her pessary. Encouraged pt to use a vaginal moisturizer like replens or rephresh over the counter. Also asked pt to give our office a call when she feels up to it and we'll fit her in for an appointment with Dr. Thompson. She voiced understanding and call ended.

## 2018-05-02 ENCOUNTER — TELEPHONE (OUTPATIENT)
Dept: UROGYNECOLOGY | Facility: CLINIC | Age: 83
End: 2018-05-02

## 2018-05-02 NOTE — TELEPHONE ENCOUNTER
----- Message from Amy Gonzalez sent at 5/2/2018  8:04 AM CDT -----  Contact: self    Name of Who is Calling: SATISH MURILLO [[id number]]      What is the request in detail: pt is calling to come in tomorrow around 2:30.. Pt states Dr Thompson will fit her into the schedule based on her availability.. Please advise      Can the clinic reply by [facility]: no      What Number to Call Back if not in [facility]: [phone]

## 2018-05-03 ENCOUNTER — OFFICE VISIT (OUTPATIENT)
Dept: UROGYNECOLOGY | Facility: CLINIC | Age: 83
End: 2018-05-03
Payer: MEDICARE

## 2018-05-03 VITALS
SYSTOLIC BLOOD PRESSURE: 102 MMHG | DIASTOLIC BLOOD PRESSURE: 78 MMHG | HEIGHT: 59 IN | WEIGHT: 120.81 LBS | BODY MASS INDEX: 24.36 KG/M2

## 2018-05-03 DIAGNOSIS — R26.89 BALANCE DISORDER: ICD-10-CM

## 2018-05-03 DIAGNOSIS — R35.1 NOCTURIA: Primary | ICD-10-CM

## 2018-05-03 DIAGNOSIS — M54.5 ACUTE BILATERAL LOW BACK PAIN, WITH SCIATICA PRESENCE UNSPECIFIED: ICD-10-CM

## 2018-05-03 DIAGNOSIS — E03.9 ACQUIRED HYPOTHYROIDISM: ICD-10-CM

## 2018-05-03 DIAGNOSIS — K59.09 CHRONIC CONSTIPATION: ICD-10-CM

## 2018-05-03 DIAGNOSIS — N95.2 VAGINAL ATROPHY: ICD-10-CM

## 2018-05-03 DIAGNOSIS — N99.3 VAGINAL VAULT PROLAPSE, POSTHYSTERECTOMY: ICD-10-CM

## 2018-05-03 DIAGNOSIS — N39.46 URINARY INCONTINENCE, MIXED: ICD-10-CM

## 2018-05-03 PROCEDURE — 99999 PR PBB SHADOW E&M-EST. PATIENT-LVL II: CPT | Mod: PBBFAC,,, | Performed by: OBSTETRICS & GYNECOLOGY

## 2018-05-03 PROCEDURE — 99214 OFFICE O/P EST MOD 30 MIN: CPT | Mod: S$GLB,,, | Performed by: OBSTETRICS & GYNECOLOGY

## 2018-05-03 NOTE — PROGRESS NOTES
"CC: pelvic organ prolapse, mild stress incontinence    HPI: 83 y/o WDWN, ELMO with history of vaginal prolapse presents for pessary cleaning. She has worn the pessary for past year without complaints. She reports that the pessary fits much better since her last visit. She denies difficulty with comfort, voiding, or defecating. She reports no vaginal discharge or bleeding with pessary. States no difficulty emptying bladder. Voids 6/day, nocturia 3-4/night, urgency if waits too long to void1-2/day. Wears 1-2 pads/day. States occasional leakage with cough/sneeze- has stopped. Denies pain with urination. States occasional constipation, improved since last visit. She is taking a new supplement for the last 2-3 months for "low thyroid" which she feels like may be helping with constipation. She reports a history of "low thyroid" diagnosis many years ago. She used to take medication for this, but reports she stopped the medication as it did not seem to help. She reports increased hair loss recently. She reports feeling tired/fatigued prior to starting these supplements. She also reports intolerance to cold. Has not had labs recently. Reports that increased fiber in diet has helped with constipation as well. She does not use stool softener.     2008:   PROCEDURES PERFORMED:   1. Perigee procedure (attached to obturator and ischial spine).    2. Anterior repair with graft, Prolift propylene and the MiniArc.   3. Cystoscopy.       1)  BRAD:  Not bad currently.  Mostly bladder pressure at night--feels sense of urgency.  Also notes some urgency during day, farnaz when out.  3-4x/PM.  In day, can hold urine ok.  +complete emptying.  No dysuria. Nocturia: 3x/PM.    2)  Vaginal atrophy: not using replens 2x./week on a regular basis  3)  POP:  Pessary ok.  No vb, discharge, bleeding.   4)  BM: +constipation.  Sprinkles fiber on food/gummies erratically.      ----------------------------    Was seeing Dr. Orellana for pessary changes " "(geographic convenience). Last visit 4/13:  Lena Driscoll is a 81 y.o. female who presents for evaluation of a vaginal vault prolapse following hysterectomy. Problem started several years ago. Symptoms include: prolapse of tissue with straining and urinary incontinence: moderate. Symptoms have stabilized with use of #3 ring pessary with support. Unfortunately the pessary has recently been coming out when she strains to defecate and she needs to be refitted, but she has trouble tolerating a larger size pessary.  PLAN: Discussed pessary and Refitted with #2 Ring with Knob and with support.  Advised on stool softeners, avoiding constipation and avoiding straining.    -------------------------------------------    Spoke with patient. Saw Dr. Orellana for her pessary check. Was informed that her pessary was rubbing. At my last visit 4/15:  --Weight loss: Use lidocaine 2% gel before next pessary change. Trial of new, smaller pessary size (#4 ring + support). Was using #5 ring + support.  --changed to #3 by Reyna on 11/15  --patient states that the pessary slipped out several times  --was then changed to #2 ring + knob at last 4/16 visit; patient states that this was very uncomfortable and had to be removed immediately  PSH:  2008:   PROCEDURES PERFORMED:   1. Perigee procedure (attached to obturator and ischial spine).   2. Anterior repair with graft, Prolift propylene and the MiniArc.   3. Cystoscopy.   (anterior mesh only on review of op notes)  Currently reports a "piece of flesh" the size of a golfball that is hanging out of the vagina. Only really present with prolonged standing. Has has had some brown spotting on pad x 2 weeks, not heavy, not feculent. No pain. Has some UI with cough.   She has 1 PM appt with us 5/13 Fri at [facility] for UDS and 230 clinic appt. Advised her to just keep 230 PM appt. Will evaluate POP to see what OR procedures would work, and see if another pessary will help. Could consider office " cystometry if needed. She is ok with this plan.    TODAY:  1) Vaginal dryness: was using REPLENS or REFRESH, sometimes coconut oil, 1-2 times/week. Following blood type diet--says not to use coconut oil.    2) Weight loss: Use lidocaine 2% gel before next pessary change. Trial of new, smaller pessary size (#4 ring + support).  Was using #5 ring + support.  3) Nocturia (nighttime urination):   --intermittent; has tried to decrease triggers later in day; does still drink at night sometimes.  Now freq max 3-4x/PM.  Normal daytime frequency.     --continues Kegel exercises  --stop fluids 2 hours before bedtime; minimize fluids consumed during night (only keep small water by bed for pills)  --elevation of feet above heart x1 hour before bed   --use of support hose during daytime   --drink any alcohol by 7 PM  --Benadryl QHS PRN   --given list of dietary irritants to consider   4) Well-woman care:   --pap:  Post-hyst.  Denies history of abnormal Pap smear. Last Pap smear, 2009, negative. No further needed.   --MMG: The patient has never had a mammogram. She continues to refuse and understands the risks of doing so.   --colonoscopy:  She does not desire annual screening including colonoscopy. She understands the risks of doing so.    --DEXA: unknown date  --follow up with PCP on [facility]  5)  Constipation:  --ok overall, takes senna PRN  --no major bouts of constipation  --has hypothyroidism--recently started seeing endocrinologists  --taking magnesium every night  6)  Pelvic organ prolapse (cystocele/rectocele):  --has not had pessary check since 8/17 ARNULFO Dx  --The patient was fit with #4 UI pessary--fell out with minimal maneuvers.  Fit with #3 ring + support--shifted too much toward/at introitus with valsalva/standing but staying in place with straining on toilet.  Refit with #4 ring + support, which seems to fit well.  No shifting with standing.   She was able to tolerate the device comfortabley with bending,  "squatting, valsalva.  She was not able to demonstrate independent removal and placement.  She tolerated the procedure well.    --no VB, discharge, pain otherwise  --UI is better--only has to wear small pad (stays fairly dry)  7)  Thyroid disorder:  --saw endocrine on [address]--was restarted on low dose of synthroid (25 mcg)--has now weaned completely off  --has noticed she's sleeping better  --also discussed preDM; has tightened carb control  8)  Worsening balance issues, h/o seizures:  --did not see neurologist yet  9)  Recent leg swelling/pain:  --saw cards yesterday; did EKG; has plans for ECHO      Review of patient's allergies indicates:   Allergen Reactions    Msg [glutamic acid hcl] Other (See Comments)     SEIZURE    Codeine Other (See Comments)     Unknown reaction    Penicillins Other (See Comments)     Unknown reaction     Past Surgical History:   Procedure Laterality Date    BLADDER SUSPENSION      HYSTERECTOMY      TONSILLECTOMY, ADENOIDECTOMY       Current Outpatient Prescriptions on File Prior to Visit   Medication Sig Dispense Refill    multivitamin with minerals tablet Take by mouth Daily. 1 Tablet Oral Every day      benzonatate (TESSALON) 100 MG capsule       lidocaine HCL 2% (XYLOCAINE) 2 % jelly Apply topically as needed. Bring tube with you to pessary check.  May use 2 g vaginally x 1 several hours after pessary check if needed. 30 mL 6    metformin (GLUCOPHAGE) 500 MG tablet       methylPREDNISolone (MEDROL DOSEPACK) 4 mg tablet       mirabegron 50 mg Tb24 Take 1 tablet (50 mg total) by mouth once daily. 30 tablet 11    SYNTHROID 25 mcg tablet       thyroid, pork, (ARMOUR THYROID) 60 mg Tab Take by mouth Daily. 3 Tablet Oral Every day       No current facility-administered medications on file prior to visit.      EXAM:  /78 (BP Location: Right arm, Patient Position: Sitting)   Ht 4' 11" (1.499 m)   Wt 54.8 kg (120 lb 13 oz)   BMI 24.40 kg/m²     General: WDWN, WF "   Abdomen: soft, nontender, no masses   MS: no edema   Pelvic:   Ext Gen: atrophic, symmetrical, no lesions   Meatus: normal, medium size caruncle noted   Urethra: nontender, well supported   2% lidocaine gel inserted vaginally for comfort with cleaning.   Vagina: atrophic, no lesions.  Small granulation tissue--AgNO3 applied.    Cervix, uterus: absent  BME: no masses  Bladder: nontender, adequate support   PFM tone: 1/5, weak   Adnexa: nontender, no masses     Aa/Ba: +3, Ap/Bp -1, C -4, GH 5, PB2, TVL 8-9.      #5 pessary in place without issue.  Cleaned and replaced.  Lidocaine gel applied before removal.    Assessment:  1. Nocturia     2. Urinary incontinence, mixed     3. Vaginal atrophy     4. Vaginal vault prolapse, posthysterectomy     5. Chronic constipation     6. Balance disorder     7. Acquired hypothyroidism     8. Acute bilateral low back pain, with sciatica presence unspecified       Plan:  1) Vaginal dryness: Use REPLENS or REFRESH. Insert 1/2 applicator full into vagina at least twice a week. Also apply small amount to vaginal opening, farnaz back side near rectum. Before next change, use replens/rephresh/or extra virgin olive oil EVERY night x 1 week to improve tissue pliability for pessary check.    2) Weight loss: Use lidocaine 2% gel before next pessary change. #5 ring + support. Please  (Majoria) and bring lidocaine gel with you to all visits.   3) Nocturia (nighttime urination):   --consider mirabegron 50 mg daily--side effects are minimal  --continue Kegel exercises  --stop fluids 2 hours before bedtime; minimize fluids consumed during night (only keep small water by bed for pills)  --elevation of feet above heart x1 hour before bed   --use of support hose during daytime   --drink any alcohol by 7 PM  --Benadryl QHS PRN   --given list of dietary irritants to consider   --continue thyroid supplement  4) Well-woman care:   --pap:  Post-hyst.  Denies history of abnormal Pap smear. Last Pap  smear, 2009, negative. No further needed.   --MMG: The patient has never had a mammogram. She continues to refuse and understands the risks of doing so.   --colonoscopy:  She does not desire annual screening including colonoscopy. She understands the risks of doing so.    --DEXA: unknown date  --follow up with PCP on [facility]  5)  Constipation:  --follow up with endocrinologist for thyroid checks  --TAKE FIBER AND STOOL SOFTENER EVERY DAY  --EVERY DAY: take daily gummy fibers + 1 stool softener/day; continue high fiber diet  --hydrate well  --continue magnesium oxide daily (400 IU daily or through other supplements)  --fiber can help reduce blood lipid levels  6)  Pelvic organ prolapse (cystocele/rectocele):  --continue with pessary use   7)   Thyroid disorder:  --follow up with endocrine  8)   Worsening balance issues, h/o seizures:  --neurology consult--please call and make appt once cardiology evaluates  9)  Leg pain/swelling:  --follow up with cardiologist  10) Back pain:  --set up with appt on [address]  11)  RTC 4 months for pessary check.      Approx 40 min spent in consult, 90% in discussion.

## 2018-05-04 PROBLEM — M54.50 ACUTE BILATERAL LOW BACK PAIN: Status: ACTIVE | Noted: 2018-05-04

## 2018-08-31 ENCOUNTER — TELEPHONE (OUTPATIENT)
Dept: UROGYNECOLOGY | Facility: CLINIC | Age: 83
End: 2018-08-31

## 2018-08-31 NOTE — TELEPHONE ENCOUNTER
----- Message from Bianca Floyd sent at 8/31/2018 10:59 AM CDT -----  Contact: self  Pt needing a call back, regarding her upcoming appt, pt can be reached at [phone].

## 2018-08-31 NOTE — TELEPHONE ENCOUNTER
Attempted to return pt's call regarding an appointment no answer left message for her to contact the office.

## 2018-09-04 ENCOUNTER — TELEPHONE (OUTPATIENT)
Dept: UROGYNECOLOGY | Facility: CLINIC | Age: 83
End: 2018-09-04

## 2018-09-04 NOTE — TELEPHONE ENCOUNTER
----- Message from Omayra Mccoy sent at 9/4/2018  2:30 PM CDT -----  Name of Who is Calling: SATISH GARAY [[id number]]      What is the request in detail: Pt would like to schedule an appt. Tried to schedule, schedule not available. in [facility].       Can the clinic reply by [facility]:    No       [facility] Number [facility] Back [facility]: [phone]

## 2018-09-12 ENCOUNTER — OFFICE VISIT (OUTPATIENT)
Dept: UROGYNECOLOGY | Facility: CLINIC | Age: 83
End: 2018-09-12
Payer: MEDICARE

## 2018-09-12 VITALS
BODY MASS INDEX: 24.27 KG/M2 | DIASTOLIC BLOOD PRESSURE: 58 MMHG | HEIGHT: 59 IN | SYSTOLIC BLOOD PRESSURE: 100 MMHG | WEIGHT: 120.38 LBS

## 2018-09-12 DIAGNOSIS — N95.2 VAGINAL ATROPHY: ICD-10-CM

## 2018-09-12 DIAGNOSIS — K59.09 CHRONIC CONSTIPATION: ICD-10-CM

## 2018-09-12 DIAGNOSIS — R41.3 MEMORY CHANGE: ICD-10-CM

## 2018-09-12 DIAGNOSIS — R35.1 NOCTURIA: ICD-10-CM

## 2018-09-12 DIAGNOSIS — N39.46 URINARY INCONTINENCE, MIXED: ICD-10-CM

## 2018-09-12 DIAGNOSIS — R26.89 BALANCE DISORDER: Primary | ICD-10-CM

## 2018-09-12 DIAGNOSIS — N99.3 VAGINAL VAULT PROLAPSE, POSTHYSTERECTOMY: ICD-10-CM

## 2018-09-12 PROCEDURE — 1101F PT FALLS ASSESS-DOCD LE1/YR: CPT | Mod: CPTII,,, | Performed by: OBSTETRICS & GYNECOLOGY

## 2018-09-12 PROCEDURE — 99999 PR PBB SHADOW E&M-EST. PATIENT-LVL III: CPT | Mod: PBBFAC,,, | Performed by: OBSTETRICS & GYNECOLOGY

## 2018-09-12 PROCEDURE — 99213 OFFICE O/P EST LOW 20 MIN: CPT | Mod: S$PBB,,, | Performed by: OBSTETRICS & GYNECOLOGY

## 2018-09-12 PROCEDURE — 99213 OFFICE O/P EST LOW 20 MIN: CPT | Mod: PBBFAC | Performed by: OBSTETRICS & GYNECOLOGY

## 2018-09-12 NOTE — PROGRESS NOTES
"CC: pelvic organ prolapse, mild stress incontinence    HPI: 81 y/o WDWN, ELMO with history of vaginal prolapse presents for pessary cleaning. She has worn the pessary for past year without complaints. She reports that the pessary fits much better since her last visit. She denies difficulty with comfort, voiding, or defecating. She reports no vaginal discharge or bleeding with pessary. States no difficulty emptying bladder. Voids 6/day, nocturia 3-4/night, urgency if waits too long to void1-2/day. Wears 1-2 pads/day. States occasional leakage with cough/sneeze- has stopped. Denies pain with urination. States occasional constipation, improved since last visit. She is taking a new supplement for the last 2-3 months for "low thyroid" which she feels like may be helping with constipation. She reports a history of "low thyroid" diagnosis many years ago. She used to take medication for this, but reports she stopped the medication as it did not seem to help. She reports increased hair loss recently. She reports feeling tired/fatigued prior to starting these supplements. She also reports intolerance to cold. Has not had labs recently. Reports that increased fiber in diet has helped with constipation as well. She does not use stool softener.     2008:   PROCEDURES PERFORMED:   1. Perigee procedure (attached to obturator and ischial spine).    2. Anterior repair with graft, Prolift propylene and the MiniArc.   3. Cystoscopy.       1)  BRAD:  Not bad currently.  Mostly bladder pressure at night--feels sense of urgency.  Also notes some urgency during day, farnaz when out.  3-4x/PM.  In day, can hold urine ok.  +complete emptying.  No dysuria. Nocturia: 3x/PM.    2)  Vaginal atrophy: not using replens 2x./week on a regular basis  3)  POP:  Pessary ok.  No vb, discharge, bleeding.   4)  BM: +constipation.  Sprinkles fiber on food/gummies erratically.      ----------------------------    Was seeing Dr. Orellana for pessary changes " "(geographic convenience). Last visit 4/13:  Lena Driscoll is a 81 y.o. female who presents for evaluation of a vaginal vault prolapse following hysterectomy. Problem started several years ago. Symptoms include: prolapse of tissue with straining and urinary incontinence: moderate. Symptoms have stabilized with use of #3 ring pessary with support. Unfortunately the pessary has recently been coming out when she strains to defecate and she needs to be refitted, but she has trouble tolerating a larger size pessary.  PLAN: Discussed pessary and Refitted with #2 Ring with Knob and with support.  Advised on stool softeners, avoiding constipation and avoiding straining.    -------------------------------------------    Spoke with patient. Saw Dr. Orellana for her pessary check. Was informed that her pessary was rubbing. At my last visit 4/15:  --Weight loss: Use lidocaine 2% gel before next pessary change. Trial of new, smaller pessary size (#4 ring + support). Was using #5 ring + support.  --changed to #3 by Reyna on 11/15  --patient states that the pessary slipped out several times  --was then changed to #2 ring + knob at last 4/16 visit; patient states that this was very uncomfortable and had to be removed immediately  PSH:  2008:   PROCEDURES PERFORMED:   1. Perigee procedure (attached to obturator and ischial spine).   2. Anterior repair with graft, Prolift propylene and the MiniArc.   3. Cystoscopy.   (anterior mesh only on review of op notes)  Currently reports a "piece of flesh" the size of a golfball that is hanging out of the vagina. Only really present with prolonged standing. Has has had some brown spotting on pad x 2 weeks, not heavy, not feculent. No pain. Has some UI with cough.   She has 1 PM appt with us 5/13 Fri at [facility] for UDS and 230 clinic appt. Advised her to just keep 230 PM appt. Will evaluate POP to see what OR procedures would work, and see if another pessary will help. Could consider office " cystometry if needed. She is ok with this plan.    TODAY:  1) Vaginal dryness: was using REPLENS or REFRESH, sometimes coconut oil, 1-2 times/week. Following blood type diet--says not to use coconut oil.    2) h/o Weight loss: Use lidocaine 2% gel before next pessary change. Trial of new, smaller pessary size (#4 ring + support).  Was using #5 ring + support.  3) Nocturia (nighttime urination):   --intermittent; has tried to decrease triggers later in day; does still drink at night sometimes.  Now freq max 2-3x/PM.  Normal daytime frequency.     --continues Kegel exercises  --stop fluids 2 hours before bedtime; minimize fluids consumed during night (only keep small water by bed for pills)  --elevation of feet above heart x1 hour before bed   --use of support hose during daytime   --drink any alcohol by 7 PM  --Benadryl QHS PRN   --given list of dietary irritants to consider   4) Well-woman care:   --pap:  Post-hyst.  Denies history of abnormal Pap smear. Last Pap smear, 2009, negative. No further needed.   --MMG: The patient has never had a mammogram. She continues to refuse and understands the risks of doing so.   --colonoscopy:  She does not desire annual screening including colonoscopy. She understands the risks of doing so.    --DEXA: unknown date  --follow up with PCP on [facility]  5)  Constipation:  --ok overall, takes senna PRN  --no major bouts of constipation  --has hypothyroidism--recently started seeing endocrinologists  --taking magnesium every night  6)  Pelvic organ prolapse (cystocele/rectocele):  --has not had pessary check since 8/17 ARNULFO Dx  --The patient was fit with #4 UI pessary--fell out with minimal maneuvers.  Fit with #3 ring + support--shifted too much toward/at introitus with valsalva/standing but staying in place with straining on toilet.  Refit with #4 ring + support, which seems to fit well.  No shifting with standing.   She was able to tolerate the device comfortabley with bending,  "squatting, valsalva.  She was not able to demonstrate independent removal and placement.  She tolerated the procedure well.    --no VB, discharge, pain otherwise  --UI is better--only has to wear small pad (stays fairly dry)  7)  Thyroid disorder:  --saw endocrine on [address]--was restarted on low dose of synthroid (25 mcg)--has now weaned completely off  --has noticed she's sleeping better  --also discussed preDM; has tightened carb control  8)  Worsening balance issues, h/o seizures:  --did not see neurologist yet  9)  Recent leg swelling/pain:  --saw cards yesterday; did EKG; has plans for ECHO      Review of patient's allergies indicates:   Allergen Reactions    Msg [glutamic acid hcl] Other (See Comments)     SEIZURE    Codeine Other (See Comments)     Unknown reaction    Penicillins Other (See Comments)     Unknown reaction     Past Surgical History:   Procedure Laterality Date    BLADDER SUSPENSION      HYSTERECTOMY      TONSILLECTOMY, ADENOIDECTOMY       Current Outpatient Medications on File Prior to Visit   Medication Sig Dispense Refill    benzonatate (TESSALON) 100 MG capsule       lidocaine HCL 2% (XYLOCAINE) 2 % jelly Apply topically as needed. Bring tube with you to pessary check.  May use 2 g vaginally x 1 several hours after pessary check if needed. 30 mL 6    metformin (GLUCOPHAGE) 500 MG tablet       methylPREDNISolone (MEDROL DOSEPACK) 4 mg tablet       multivitamin with minerals tablet Take by mouth Daily. 1 Tablet Oral Every day      SYNTHROID 25 mcg tablet       thyroid, pork, (ARMOUR THYROID) 60 mg Tab Take by mouth Daily. 3 Tablet Oral Every day      mirabegron 50 mg Tb24 Take 1 tablet (50 mg total) by mouth once daily. 30 tablet 11     No current facility-administered medications on file prior to visit.      EXAM:  BP (!) 100/58 (BP Location: Left arm, Patient Position: Sitting, BP Method: Large (Manual))   Ht 4' 11" (1.499 m)   Wt 54.6 kg (120 lb 5.9 oz)   BMI 24.31 kg/m² "     General: WDWN, WF   Abdomen: soft, nontender, no masses   MS: no edema   Pelvic:   Ext Gen: atrophic, symmetrical, no lesions   Meatus: normal, medium size caruncle noted   Urethra: nontender, well supported   2% lidocaine gel inserted vaginally for comfort with cleaning.   Vagina: atrophic, no lesions.  Small granulation tissue--AgNO3 applied.    Cervix, uterus: absent  BME: no masses  Bladder: nontender, adequate support   PFM tone: 1/5, weak   Adnexa: nontender, no masses     Aa/Ba: +3, Ap/Bp -1, C -4, GH 5, PB2, TVL 8-9.      #5 pessary in place without issue.  Cleaned and replaced.  Lidocaine gel applied before removal.    Assessment:  1. Balance disorder  Ambulatory consult to Neurology   2. Memory change  Ambulatory consult to Neurology   3. Nocturia     4. Urinary incontinence, mixed     5. Vaginal atrophy     6. Vaginal vault prolapse, posthysterectomy     7. Chronic constipation       Plan:  1) Vaginal dryness: Use REPLENS or REFRESH. Insert 1/2 applicator full into vagina at least twice a week. Also apply small amount to vaginal opening, farnaz back side near rectum. Before next change, use replens/rephresh/or extra virgin olive oil EVERY night x 1 week to improve tissue pliability for pessary check.    2) Weight loss: Use lidocaine 2% gel before next pessary change. #5 ring + support. Please  (Majoria) and bring lidocaine gel with you to all visits.   3) Nocturia (nighttime urination):   --consider mirabegron 50 mg daily--side effects are minimal  --continue Kegel exercises  --stop fluids 2 hours before bedtime; minimize fluids consumed during night (only keep small water by bed for pills)  --elevation of feet above heart x1 hour before bed   --use of support hose during daytime   --drink any alcohol by 7 PM  --Benadryl QHS PRN   --given list of dietary irritants to consider   --continue thyroid supplement  4) Well-woman care:   --pap:  Post-hyst.  Denies history of abnormal Pap smear. Last Pap  smear, 2009, negative. No further needed.   --MMG: The patient has never had a mammogram. She continues to refuse and understands the risks of doing so.   --colonoscopy:  She does not desire annual screening including colonoscopy. She understands the risks of doing so.    --DEXA: unknown date  --follow up with PCP on [facility]  5)  Constipation:  --follow up with endocrinologist for thyroid checks  --TAKE FIBER AND STOOL SOFTENER EVERY DAY  --EVERY DAY: take daily gummy fibers + 1 stool softener/day; continue high fiber diet  --hydrate well  --continue magnesium oxide daily (400 IU daily or through other supplements)  --fiber can help reduce blood lipid levels  6)  Pelvic organ prolapse (cystocele/rectocele):  --continue with pessary use   7)   Thyroid disorder:  --follow up with endocrine  8)   Worsening balance issues, h/o seizures:  --neurology consult--please call and make appt once cardiology evaluates  9)  Leg pain/swelling:  --follow up with cardiologist  10)  RTC 4 months for pessary check.      Approx 30 min spent in consult, 90% in discussion.

## 2019-01-17 ENCOUNTER — OFFICE VISIT (OUTPATIENT)
Dept: UROGYNECOLOGY | Facility: CLINIC | Age: 84
End: 2019-01-17
Payer: MEDICARE

## 2019-01-17 VITALS
BODY MASS INDEX: 24.27 KG/M2 | DIASTOLIC BLOOD PRESSURE: 68 MMHG | WEIGHT: 120.38 LBS | HEIGHT: 59 IN | SYSTOLIC BLOOD PRESSURE: 118 MMHG

## 2019-01-17 DIAGNOSIS — R26.89 BALANCE DISORDER: ICD-10-CM

## 2019-01-17 DIAGNOSIS — N99.3 VAGINAL VAULT PROLAPSE, POSTHYSTERECTOMY: ICD-10-CM

## 2019-01-17 DIAGNOSIS — K59.09 CHRONIC CONSTIPATION: ICD-10-CM

## 2019-01-17 DIAGNOSIS — R35.1 NOCTURIA: Primary | ICD-10-CM

## 2019-01-17 DIAGNOSIS — E03.9 ACQUIRED HYPOTHYROIDISM: ICD-10-CM

## 2019-01-17 DIAGNOSIS — N95.2 VAGINAL ATROPHY: ICD-10-CM

## 2019-01-17 DIAGNOSIS — N39.46 URINARY INCONTINENCE, MIXED: ICD-10-CM

## 2019-01-17 PROCEDURE — 99999 PR PBB SHADOW E&M-EST. PATIENT-LVL III: CPT | Mod: PBBFAC,,, | Performed by: OBSTETRICS & GYNECOLOGY

## 2019-01-17 PROCEDURE — 1101F PT FALLS ASSESS-DOCD LE1/YR: CPT | Mod: CPTII,S$GLB,, | Performed by: OBSTETRICS & GYNECOLOGY

## 2019-01-17 PROCEDURE — 99999 PR PBB SHADOW E&M-EST. PATIENT-LVL III: ICD-10-PCS | Mod: PBBFAC,,, | Performed by: OBSTETRICS & GYNECOLOGY

## 2019-01-17 PROCEDURE — 99213 PR OFFICE/OUTPT VISIT, EST, LEVL III, 20-29 MIN: ICD-10-PCS | Mod: S$GLB,,, | Performed by: OBSTETRICS & GYNECOLOGY

## 2019-01-17 PROCEDURE — 1101F PR PT FALLS ASSESS DOC 0-1 FALLS W/OUT INJ PAST YR: ICD-10-PCS | Mod: CPTII,S$GLB,, | Performed by: OBSTETRICS & GYNECOLOGY

## 2019-01-17 PROCEDURE — 99213 OFFICE O/P EST LOW 20 MIN: CPT | Mod: S$GLB,,, | Performed by: OBSTETRICS & GYNECOLOGY

## 2019-01-17 RX ORDER — LIOTHYRONINE SODIUM 5 UG/1
TABLET ORAL
COMMUNITY
Start: 2018-12-31

## 2019-01-17 NOTE — PATIENT INSTRUCTIONS
1) Vaginal dryness: Use REPLENS or REFRESH. Insert 1/2 applicator full into vagina at least twice a week. Also apply small amount to vaginal opening, farnaz back side near rectum. Before next change, use replens/rephresh/or extra virgin olive oil EVERY night x 1 week to improve tissue pliability for pessary check.    2) Weight loss: Use lidocaine 2% gel before next pessary change. #5 ring + support. Please  (Majoria) and bring lidocaine gel with you to all visits.   3) Nocturia (nighttime urination):   --consider mirabegron 50 mg daily--side effects are minimal  --continue Kegel exercises  --stop fluids 2 hours before bedtime; minimize fluids consumed during night (only keep small water by bed for pills)  --elevation of feet above heart x1 hour before bed   --use of support hose during daytime   --drink any alcohol by 7 PM  --Benadryl QHS PRN   --given list of dietary irritants to consider   --continue thyroid supplement  4) Well-woman care:   --pap:  Post-hyst.  Denies history of abnormal Pap smear. Last Pap smear, 2009, negative. No further needed.   --MMG: The patient has never had a mammogram. She continues to refuse and understands the risks of doing so.   --colonoscopy:  She does not desire annual screening including colonoscopy. She understands the risks of doing so.    --DEXA: unknown date  --follow up with PCP on JONO Daley  5)  Constipation:  --follow up with endocrinologist for thyroid checks  --TAKE FIBER AND STOOL SOFTENER EVERY DAY  --EVERY DAY: take daily gummy fibers + 1 stool softener/day; continue high fiber diet  --hydrate well  --continue magnesium oxide daily (400 IU daily or through other supplements)  --fiber can help reduce blood lipid levels  6)  Pelvic organ prolapse (cystocele/rectocele):  --continue with pessary use   7)   Thyroid disorder:  --follow up with endocrine  8)   Worsening balance issues, h/o seizures:  --neurology consult--please call and make appt once cardiology  evaluates  9)  Leg pain/swelling:  --follow up with cardiologist  10)  RTC 4 months for pessary check.

## 2019-01-17 NOTE — PROGRESS NOTES
"CC: pelvic organ prolapse, mild stress incontinence    HPI: 81 y/o WDWN, ELMO with history of vaginal prolapse presents for pessary cleaning. She has worn the pessary for past year without complaints. She reports that the pessary fits much better since her last visit. She denies difficulty with comfort, voiding, or defecating. She reports no vaginal discharge or bleeding with pessary. States no difficulty emptying bladder. Voids 6/day, nocturia 3-4/night, urgency if waits too long to void1-2/day. Wears 1-2 pads/day. States occasional leakage with cough/sneeze- has stopped. Denies pain with urination. States occasional constipation, improved since last visit. She is taking a new supplement for the last 2-3 months for "low thyroid" which she feels like may be helping with constipation. She reports a history of "low thyroid" diagnosis many years ago. She used to take medication for this, but reports she stopped the medication as it did not seem to help. She reports increased hair loss recently. She reports feeling tired/fatigued prior to starting these supplements. She also reports intolerance to cold. Has not had labs recently. Reports that increased fiber in diet has helped with constipation as well. She does not use stool softener.     2008:   PROCEDURES PERFORMED:   1. Perigee procedure (attached to obturator and ischial spine).    2. Anterior repair with graft, Prolift propylene and the MiniArc.   3. Cystoscopy.       1)  BRAD:  Not bad currently.  Mostly bladder pressure at night--feels sense of urgency.  Also notes some urgency during day, farnaz when out.  3-4x/PM.  In day, can hold urine ok.  +complete emptying.  No dysuria. Nocturia: 3x/PM.    2)  Vaginal atrophy: not using replens 2x./week on a regular basis  3)  POP:  Pessary ok.  No vb, discharge, bleeding.   4)  BM: +constipation.  Sprinkles fiber on food/gummies erratically.      ----------------------------    Was seeing Dr. Orellana for pessary changes " "(geographic convenience). Last visit 4/13:  Lena Driscoll is a 81 y.o. female who presents for evaluation of a vaginal vault prolapse following hysterectomy. Problem started several years ago. Symptoms include: prolapse of tissue with straining and urinary incontinence: moderate. Symptoms have stabilized with use of #3 ring pessary with support. Unfortunately the pessary has recently been coming out when she strains to defecate and she needs to be refitted, but she has trouble tolerating a larger size pessary.  PLAN: Discussed pessary and Refitted with #2 Ring with Knob and with support.  Advised on stool softeners, avoiding constipation and avoiding straining.    -------------------------------------------    Spoke with patient. Saw Dr. Orellana for her pessary check. Was informed that her pessary was rubbing. At my last visit 4/15:  --Weight loss: Use lidocaine 2% gel before next pessary change. Trial of new, smaller pessary size (#4 ring + support). Was using #5 ring + support.  --changed to #3 by Reyna on 11/15  --patient states that the pessary slipped out several times  --was then changed to #2 ring + knob at last 4/16 visit; patient states that this was very uncomfortable and had to be removed immediately  PSH:  2008:   PROCEDURES PERFORMED:   1. Perigee procedure (attached to obturator and ischial spine).   2. Anterior repair with graft, Prolift propylene and the MiniArc.   3. Cystoscopy.   (anterior mesh only on review of op notes)  Currently reports a "piece of flesh" the size of a golfball that is hanging out of the vagina. Only really present with prolonged standing. Has has had some brown spotting on pad x 2 weeks, not heavy, not feculent. No pain. Has some UI with cough.   She has 1 PM appt with us 5/13 Fri at [facility] for UDS and 230 clinic appt. Advised her to just keep 230 PM appt. Will evaluate POP to see what OR procedures would work, and see if another pessary will help. Could consider office " cystometry if needed. She is ok with this plan.    TODAY:  1) Vaginal dryness: was using REPLENS or REFRESH, sometimes coconut oil, 1-2 times/week. Following blood type diet--says not to use coconut oil.    2) h/o Weight loss: Use lidocaine 2% gel before next pessary change. Trial of new, smaller pessary size (#4 ring + support).  Was using #5 ring + support.  3) Nocturia (nighttime urination):  --improved  --increased magnesium supplement due to RLS; hasn't been hydrating   --intermittent; has tried to decrease triggers later in day; does still drink at night sometimes.  Now freq max 2-3x/PM.  Normal daytime frequency.     --continues Kegel exercises +/-  --stop fluids 2 hours before bedtime; minimize fluids consumed during night (only keep small water by bed for pills)  --elevation of feet above heart x1 hour before bed   --use of support hose during daytime   --drink any alcohol by 7 PM  --Benadryl QHS PRN   --given list of dietary irritants to consider   4) Well-woman care:   --pap:  Post-hyst.  Denies history of abnormal Pap smear. Last Pap smear, 2009, negative. No further needed.   --MMG: The patient has never had a mammogram. She continues to refuse and understands the risks of doing so.   --colonoscopy:  She does not desire annual screening including colonoscopy. She understands the risks of doing so.    --DEXA: unknown date  --follow up with PCP on JONO Daley  5)  Constipation:  --ok overall, takes senna PRN  --no major bouts of constipation  --has hypothyroidism--recently started seeing endocrinologists  --taking magnesium every night  6)  Pelvic organ prolapse (cystocele/rectocele):  --has not had pessary check since 8/17 ARNULFO Dx  --The patient was fit with #4 UI pessary--fell out with minimal maneuvers.  Fit with #3 ring + support--shifted too much toward/at introitus with valsalva/standing but staying in place with straining on toilet.  Refit with #4 ring + support, which seems to fit well.  No shifting  "with standing.   She was able to tolerate the device comfortabley with bending, squatting, valsalva.  She was not able to demonstrate independent removal and placement.  She tolerated the procedure well.    --no VB, discharge, pain otherwise  --UI is better--only has to wear small pad (stays fairly dry)  7)  Thyroid disorder:  --saw endocrine on [address]--was restarted on low dose of synthroid (25 mcg)--has now weaned completely off  --has noticed she's sleeping better  --also discussed preDM; has tightened carb control  8)  Worsening balance issues, h/o seizures:  --did not see neurologist yet  --seeing chiropractor instead  9)  Recent leg swelling/pain:  --saw cards yesterday; did EKG; has plans for ECHO      Review of patient's allergies indicates:   Allergen Reactions    Msg [glutamic acid hcl] Other (See Comments)     SEIZURE    Codeine Other (See Comments)     Unknown reaction    Penicillins Other (See Comments)     Unknown reaction     Past Surgical History:   Procedure Laterality Date    back surgery 2018      BLADDER SUSPENSION      HYSTERECTOMY      TONSILLECTOMY, ADENOIDECTOMY       Current Outpatient Medications on File Prior to Visit   Medication Sig Dispense Refill    liothyronine (CYTOMEL) 5 MCG Tab       multivitamin with minerals tablet Take by mouth Daily. 1 Tablet Oral Every day      SYNTHROID 25 mcg tablet       benzonatate (TESSALON) 100 MG capsule       mirabegron 50 mg Tb24 Take 1 tablet (50 mg total) by mouth once daily. 30 tablet 11     No current facility-administered medications on file prior to visit.      EXAM:  /68   Ht 4' 11" (1.499 m)   Wt 54.6 kg (120 lb 5.9 oz)   BMI 24.31 kg/m²     General: WDWN, WF   Abdomen: soft, nontender, no masses   MS: no edema   Pelvic:   Ext Gen: atrophic, symmetrical, no lesions   Meatus: normal, medium size caruncle noted   Urethra: nontender, well supported   2% lidocaine gel inserted vaginally for comfort with cleaning.   Vagina: " atrophic, no lesions.    Cervix, uterus: absent  BME: no masses  Bladder: nontender, adequate support   PFM tone: 1/5, weak   Adnexa: nontender, no masses     Aa/Ba: +3, Ap/Bp -1, C -4, GH 5, PB2, TVL 8-9.      #5 pessary in place without issue.  Cleaned and replaced.  Lidocaine gel applied before removal.    Assessment:  1. Nocturia     2. Urinary incontinence, mixed     3. Vaginal atrophy     4. Vaginal vault prolapse, posthysterectomy     5. Acquired hypothyroidism     6. Chronic constipation     7. Balance disorder       Plan:  1) Vaginal dryness: Use REPLENS or REFRESH. Insert 1/2 applicator full into vagina at least twice a week. Also apply small amount to vaginal opening, farnaz back side near rectum. Before next change, use replens/rephresh/or extra virgin olive oil EVERY night x 1 week to improve tissue pliability for pessary check.    2) Weight loss: Use lidocaine 2% gel before next pessary change. #5 ring + support. Please  (Majoria) and bring lidocaine gel with you to all visits.   3) Nocturia (nighttime urination):   --consider mirabegron 50 mg daily--side effects are minimal  --continue Kegel exercises  --stop fluids 2 hours before bedtime; minimize fluids consumed during night (only keep small water by bed for pills)  --elevation of feet above heart x1 hour before bed   --use of support hose during daytime   --drink any alcohol by 7 PM  --Benadryl QHS PRN   --given list of dietary irritants to consider   --continue thyroid supplement  4) Well-woman care:   --pap:  Post-hyst.  Denies history of abnormal Pap smear. Last Pap smear, 2009, negative. No further needed.   --MMG: The patient has never had a mammogram. She continues to refuse and understands the risks of doing so.   --colonoscopy:  She does not desire annual screening including colonoscopy. She understands the risks of doing so.    --DEXA: unknown date  --follow up with PCP on JONO Daley  5)  Constipation:  --follow up with endocrinologist  for thyroid checks  --TAKE FIBER AND STOOL SOFTENER EVERY DAY  --EVERY DAY: take daily gummy fibers + 1 stool softener/day; continue high fiber diet  --hydrate well  --continue magnesium oxide daily (400 IU daily or through other supplements)  --fiber can help reduce blood lipid levels  6)  Pelvic organ prolapse (cystocele/rectocele):  --continue with pessary use   7)   Thyroid disorder:  --follow up with endocrine  8)   Worsening balance issues, h/o seizures:  --neurology consult--please call and make appt once cardiology evaluates  9)  Leg pain/swelling:  --follow up with cardiologist  10)  RTC 4 months for pessary check.      Approx 30 min spent in consult, 90% in discussion.

## 2019-05-14 ENCOUNTER — TELEPHONE (OUTPATIENT)
Dept: UROGYNECOLOGY | Facility: CLINIC | Age: 84
End: 2019-05-14

## 2019-05-14 ENCOUNTER — TELEPHONE (OUTPATIENT)
Dept: UROLOGY | Facility: CLINIC | Age: 84
End: 2019-05-14

## 2019-05-14 NOTE — TELEPHONE ENCOUNTER
----- Message from Rosy Limon MA sent at 5/14/2019 11:15 AM CDT -----  Chuckie MELÉNDEZ Staff  Caller: Unspecified (Today, 10:28 AM)         PT HAS A APPT TOMORROW SHE NEEDS TO RESCHEDULE AND THE NEXT AVAILABLE IS IN June SHE NEEDS TO COME IN BEFORE THAT [phone]

## 2019-05-14 NOTE — TELEPHONE ENCOUNTER
----- Message from Chuckie Malagon sent at 5/14/2019 10:28 AM CDT -----  PT HAS A APPT TOMORROW SHE NEEDS TO RESCHEDULE AND THE NEXT AVAILABLE IS IN June SHE NEEDS TO COME IN BEFORE THAT [phone]

## 2019-05-29 ENCOUNTER — OFFICE VISIT (OUTPATIENT)
Dept: UROGYNECOLOGY | Facility: CLINIC | Age: 84
End: 2019-05-29
Payer: MEDICARE

## 2019-05-29 DIAGNOSIS — M54.5 ACUTE BILATERAL LOW BACK PAIN, WITH SCIATICA PRESENCE UNSPECIFIED: ICD-10-CM

## 2019-05-29 DIAGNOSIS — N99.3 VAGINAL VAULT PROLAPSE, POSTHYSTERECTOMY: ICD-10-CM

## 2019-05-29 DIAGNOSIS — N39.46 URINARY INCONTINENCE, MIXED: ICD-10-CM

## 2019-05-29 DIAGNOSIS — E03.9 ACQUIRED HYPOTHYROIDISM: ICD-10-CM

## 2019-05-29 DIAGNOSIS — K59.09 CHRONIC CONSTIPATION: ICD-10-CM

## 2019-05-29 DIAGNOSIS — R26.89 BALANCE DISORDER: ICD-10-CM

## 2019-05-29 DIAGNOSIS — R35.1 NOCTURIA: Primary | ICD-10-CM

## 2019-05-29 DIAGNOSIS — N95.2 VAGINAL ATROPHY: ICD-10-CM

## 2019-05-29 PROCEDURE — 1101F PT FALLS ASSESS-DOCD LE1/YR: CPT | Mod: CPTII,S$GLB,, | Performed by: OBSTETRICS & GYNECOLOGY

## 2019-05-29 PROCEDURE — 1101F PR PT FALLS ASSESS DOC 0-1 FALLS W/OUT INJ PAST YR: ICD-10-PCS | Mod: CPTII,S$GLB,, | Performed by: OBSTETRICS & GYNECOLOGY

## 2019-05-29 PROCEDURE — 99213 PR OFFICE/OUTPT VISIT, EST, LEVL III, 20-29 MIN: ICD-10-PCS | Mod: S$GLB,,, | Performed by: OBSTETRICS & GYNECOLOGY

## 2019-05-29 PROCEDURE — 99999 PR PBB SHADOW E&M-EST. PATIENT-LVL II: CPT | Mod: PBBFAC,,, | Performed by: OBSTETRICS & GYNECOLOGY

## 2019-05-29 PROCEDURE — 99213 OFFICE O/P EST LOW 20 MIN: CPT | Mod: S$GLB,,, | Performed by: OBSTETRICS & GYNECOLOGY

## 2019-05-29 PROCEDURE — 99999 PR PBB SHADOW E&M-EST. PATIENT-LVL II: ICD-10-PCS | Mod: PBBFAC,,, | Performed by: OBSTETRICS & GYNECOLOGY

## 2019-05-29 NOTE — PROGRESS NOTES
"CC: pelvic organ prolapse, mild stress incontinence    HPI: 83 y/o WDWN, ELMO with history of vaginal prolapse presents for pessary cleaning. She has worn the pessary for past year without complaints. She reports that the pessary fits much better since her last visit. She denies difficulty with comfort, voiding, or defecating. She reports no vaginal discharge or bleeding with pessary. States no difficulty emptying bladder. Voids 6/day, nocturia 3-4/night, urgency if waits too long to void1-2/day. Wears 1-2 pads/day. States occasional leakage with cough/sneeze- has stopped. Denies pain with urination. States occasional constipation, improved since last visit. She is taking a new supplement for the last 2-3 months for "low thyroid" which she feels like may be helping with constipation. She reports a history of "low thyroid" diagnosis many years ago. She used to take medication for this, but reports she stopped the medication as it did not seem to help. She reports increased hair loss recently. She reports feeling tired/fatigued prior to starting these supplements. She also reports intolerance to cold. Has not had labs recently. Reports that increased fiber in diet has helped with constipation as well. She does not use stool softener.     2008:   PROCEDURES PERFORMED:   1. Perigee procedure (attached to obturator and ischial spine).    2. Anterior repair with graft, Prolift propylene and the MiniArc.   3. Cystoscopy.       1)  BRAD:  Not bad currently.  Mostly bladder pressure at night--feels sense of urgency.  Also notes some urgency during day, farnaz when out.  3-4x/PM.  In day, can hold urine ok.  +complete emptying.  No dysuria. Nocturia: 3x/PM.    2)  Vaginal atrophy: not using replens 2x./week on a regular basis  3)  POP:  Pessary ok.  No vb, discharge, bleeding.   4)  BM: +constipation.  Sprinkles fiber on food/gummies erratically.      ----------------------------    Was seeing Dr. Orellana for pessary changes " "(geographic convenience). Last visit 4/13:  Lena Driscoll is a 81 y.o. female who presents for evaluation of a vaginal vault prolapse following hysterectomy. Problem started several years ago. Symptoms include: prolapse of tissue with straining and urinary incontinence: moderate. Symptoms have stabilized with use of #3 ring pessary with support. Unfortunately the pessary has recently been coming out when she strains to defecate and she needs to be refitted, but she has trouble tolerating a larger size pessary.  PLAN: Discussed pessary and Refitted with #2 Ring with Knob and with support.  Advised on stool softeners, avoiding constipation and avoiding straining.    -------------------------------------------    Spoke with patient. Saw Dr. Orellana for her pessary check. Was informed that her pessary was rubbing. At my last visit 4/15:  --Weight loss: Use lidocaine 2% gel before next pessary change. Trial of new, smaller pessary size (#4 ring + support). Was using #5 ring + support.  --changed to #3 by Reyna on 11/15  --patient states that the pessary slipped out several times  --was then changed to #2 ring + knob at last 4/16 visit; patient states that this was very uncomfortable and had to be removed immediately  PSH:  2008:   PROCEDURES PERFORMED:   1. Perigee procedure (attached to obturator and ischial spine).   2. Anterior repair with graft, Prolift propylene and the MiniArc.   3. Cystoscopy.   (anterior mesh only on review of op notes)  Currently reports a "piece of flesh" the size of a golfball that is hanging out of the vagina. Only really present with prolonged standing. Has has had some brown spotting on pad x 2 weeks, not heavy, not feculent. No pain. Has some UI with cough.   She has 1 PM appt with us 5/13 Fri at [facility] for UDS and 230 clinic appt. Advised her to just keep 230 PM appt. Will evaluate POP to see what OR procedures would work, and see if another pessary will help. Could consider office " cystometry if needed. She is ok with this plan.    TODAY:  1) Vaginal dryness: was using REPLENS or REFRESH, sometimes coconut oil, 1-2 times/week. Following blood type diet--says not to use coconut oil.    2) h/o Weight loss: Use lidocaine 2% gel before next pessary change. Trial of new, smaller pessary size (#4 ring + support).  Was using #5 ring + support.  3) Nocturia (nighttime urination):  --improved  --increased magnesium supplement due to RLS; hasn't been hydrating   --intermittent; has tried to decrease triggers later in day; does still drink at night sometimes.  Now freq max 2-3x/PM.  Normal daytime frequency.     --continues Kegel exercises +/-  --stop fluids 2 hours before bedtime; minimize fluids consumed during night (only keep small water by bed for pills)  --elevation of feet above heart x1 hour before bed   --use of support hose during daytime   --drink any alcohol by 7 PM  --Benadryl QHS PRN   --given list of dietary irritants to consider   4) Well-woman care:   --pap:  Post-hyst.  Denies history of abnormal Pap smear. Last Pap smear, 2009, negative. No further needed.   --MMG: The patient has never had a mammogram. She continues to refuse and understands the risks of doing so.   --colonoscopy:  She does not desire annual screening including colonoscopy. She understands the risks of doing so.    --DEXA: unknown date  --follow up with PCP on JONO Daley  5)  Constipation:  --ok overall, takes senna PRN  --no major bouts of constipation  --has hypothyroidism--recently started seeing endocrinologists  --taking magnesium every night  6)  Pelvic organ prolapse (cystocele/rectocele):  --has not had pessary check since 8/17 ARNULFO Dx  --The patient was fit with #4 UI pessary--fell out with minimal maneuvers.  Fit with #3 ring + support--shifted too much toward/at introitus with valsalva/standing but staying in place with straining on toilet.  Refit with #4 ring + support, which seems to fit well.  No shifting  with standing.   She was able to tolerate the device comfortabley with bending, squatting, valsalva.  She was not able to demonstrate independent removal and placement.  She tolerated the procedure well.    --no VB, discharge, pain otherwise  --UI is better--only has to wear small pad (stays fairly dry)  7)  Thyroid disorder:  --saw endocrine on [address]--was restarted on low dose of synthroid (25 mcg)--has now weaned completely off  --has noticed she's sleeping better  --also discussed preDM; has tightened carb control  8)  Worsening balance issues, h/o seizures:  --did not see neurologist yet  --seeing chiropractor instead  9)  Recent leg swelling/pain:  --saw cards yesterday; did EKG; has plans for ECHO  10)  Depressed mood:  --patient became very teary during visit today--states she is afraid of pulmonary status (has recently ordered home O2); feels sad a lot and feels like she can't talk with her family about this;  No SI/HI      Review of patient's allergies indicates:   Allergen Reactions    Msg [glutamic acid hcl] Other (See Comments)     SEIZURE    Codeine Other (See Comments)     Unknown reaction    Penicillins Other (See Comments)     Unknown reaction     Past Surgical History:   Procedure Laterality Date    back surgery 2018      BLADDER SUSPENSION      HYSTERECTOMY      TONSILLECTOMY, ADENOIDECTOMY       Current Outpatient Medications on File Prior to Visit   Medication Sig Dispense Refill    benzonatate (TESSALON) 100 MG capsule       liothyronine (CYTOMEL) 5 MCG Tab       multivitamin with minerals tablet Take by mouth Daily. 1 Tablet Oral Every day      SYNTHROID 25 mcg tablet       mirabegron 50 mg Tb24 Take 1 tablet (50 mg total) by mouth once daily. 30 tablet 11     No current facility-administered medications on file prior to visit.      EXAM:  There were no vitals taken for this visit.    General: WDWN, WF   Abdomen: soft, nontender, no masses   MS: no edema   Pelvic:   Ext Gen:  atrophic, symmetrical, no lesions   Meatus: normal, medium size caruncle noted   Urethra: nontender, well supported   2% lidocaine gel inserted vaginally for comfort with cleaning.   Vagina: atrophic, no lesions.    Cervix, uterus: absent  BME: no masses  Bladder: nontender, adequate support   PFM tone: 1/5, weak   Adnexa: nontender, no masses     Aa/Ba: +3, Ap/Bp -1, C -4, GH 5, PB2, TVL 8-9.      #5 pessary in place without issue.  Cleaned and replaced.  Lidocaine gel applied before removal.    Assessment:  1. Nocturia     2. Urinary incontinence, mixed     3. Vaginal atrophy     4. Vaginal vault prolapse, posthysterectomy     5. Chronic constipation     6. Acquired hypothyroidism     7. Acute bilateral low back pain, with sciatica presence unspecified     8. Balance disorder       Plan:  1) Vaginal dryness: Use REPLENS or REFRESH. Insert 1/2 applicator full into vagina at least twice a week. Also apply small amount to vaginal opening, farnaz back side near rectum. Before next change, use replens/rephresh/or extra virgin olive oil EVERY night x 1 week to improve tissue pliability for pessary check.    2) Weight loss: Use lidocaine 2% gel before next pessary change. #5 ring + support. Please  (Majoria) and bring lidocaine gel with you to all visits.   3) Nocturia (nighttime urination):   --consider mirabegron 50 mg daily--side effects are minimal  --continue Kegel exercises  --stop fluids 2 hours before bedtime; minimize fluids consumed during night (only keep small water by bed for pills)  --elevation of feet above heart x1 hour before bed   --use of support hose during daytime   --drink any alcohol by 7 PM  --Benadryl QHS PRN   --given list of dietary irritants to consider   --continue thyroid supplement  4) Well-woman care:   --pap:  Post-hyst.  Denies history of abnormal Pap smear. Last Pap smear, 2009, negative. No further needed.   --MMG: The patient has never had a mammogram. She continues to refuse and  understands the risks of doing so.   --colonoscopy:  She does not desire annual screening including colonoscopy. She understands the risks of doing so.    --DEXA: unknown date  --follow up with PCP on JONO Daley  5)  Constipation:  --follow up with endocrinologist for thyroid checks  --TAKE FIBER AND STOOL SOFTENER EVERY DAY  --EVERY DAY: take daily gummy fibers + 1 stool softener/day; continue high fiber diet  --hydrate well  --continue magnesium oxide daily (400 IU daily or through other supplements)  --fiber can help reduce blood lipid levels  6)  Pelvic organ prolapse (cystocele/rectocele):  --continue with pessary use   7)   Thyroid disorder:  --follow up with endocrine  8)   Worsening balance issues, h/o seizures:  --neurology consult--please call and make appt once cardiology evaluates  9)  Leg pain/swelling:  --follow up with cardiologist  10)   Depressed mood:  --had lengthy conversation with patient: she is open to speaking with a therapist (will see if one is available from [facility], since she is a CA survivor)  --discussed issues with daughter, per patient's ok; daughter understands that patient is struggling with depressive symptoms and may need help/encouragement to attend therapy  11)   RTC 4 months for pessary check.      Approx 30 min spent in consult, 90% in discussion.

## 2019-06-03 ENCOUNTER — TELEPHONE (OUTPATIENT)
Dept: UROGYNECOLOGY | Facility: CLINIC | Age: 84
End: 2019-06-03

## 2019-06-03 ENCOUNTER — DOCUMENTATION ONLY (OUTPATIENT)
Dept: HEMATOLOGY/ONCOLOGY | Facility: CLINIC | Age: 84
End: 2019-06-03

## 2019-06-03 NOTE — TELEPHONE ENCOUNTER
Shanae Thakur cancer  gave me a call back and stated she reviewed pt's records and didn't see where pt had been treated at [facility] for  Her cancer, she also questioned what type of cancer pt had and what and why urogyn was referring pt, gave MsLalithaOfe as much information I could she states she will give the patient a call but she doesn't typically deal with pt with lung cancer.

## 2019-06-03 NOTE — TELEPHONE ENCOUNTER
I have left message twice for Shanae Thakur (cancer center ) to contact pt to schedule an appointment a brief message left on the v/m regarding nature of the visit also gave 's daughter the information when I walked her out to the waiting area I don't see where she was scheduled so I attempted to call Ms.fOe again and was sent to her voicemail.

## 2019-06-04 ENCOUNTER — DOCUMENTATION ONLY (OUTPATIENT)
Dept: HEMATOLOGY/ONCOLOGY | Facility: CLINIC | Age: 84
End: 2019-06-04

## 2019-06-04 NOTE — PROGRESS NOTES
Called pt. This am to give her information to schedule appt. With Dr. Light, PhD with [facility] ([phone]), if she is interested in meeting with her.  Pt. States that she is unable to talk at this time as she is getting ready to take a shower. Will call back later.

## 2019-06-04 NOTE — PROGRESS NOTES
Call received from Dr. Thompson's office inquiring about pt. Needing help with  Resources for patient. Inquired to staff nature of referral. Informed that MD wanting to refer pt. For counseling as she appears depressed due to lung cancer diagnosis. After reviewing chart found that pt. Is not treated at [facility] for her lung cancer. Informed staff that I would reach out to the pt. To see what her needs are and assist as able. Contacted the pt. And was informed that she is currently a patient at [facility]. Per patient she was recently told that her lung cancer is in remission but she is concerned that she was put on oxygen due to her COPD. There is currently no cancer support groups offered at [facility] for General cancer patients. Pt. Reports that she saw information at her MD's office re: support group but when she called she was told that the group is not meeting at this time. Pt. States that she is interested in resources where she could talk about her issues related to her lung cancer. Contacted [facility] ([phone]) and spoke with nurse (Danielle) for Dr. Morgan's office (pts. Radiation Oncologist) who states that they are very familiar with the pt. Nurse states that they are surprised that pt. Has not mentioned any concerns or needs as she is in touch with the office frequently. Dr. Morgan's [facility] currently [facility] not [facility] as she passed away unexpectedly a few weeks ago. Nurse from Dr. Morgan's office will reach out to the patient and see what they can assist with. Also spoke with Dr. Alonso Light, PhD with [facility]. She states that she does accept referrals from outside hospitals to see patients for long term counseling. Will provide pt. With phone # to schedule appt. With Dr. Light.will follow as needed.

## 2019-07-30 ENCOUNTER — HOSPITAL ENCOUNTER (INPATIENT)
Facility: HOSPITAL | Age: 84
LOS: 3 days | Discharge: NURSING, REHAB OR LONG-TERM CARE | DRG: 481 | End: 2019-08-02
Attending: EMERGENCY MEDICINE | Admitting: INTERNAL MEDICINE
Payer: MEDICARE
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
## 2019-07-30 DIAGNOSIS — I50.30 (HFPEF) HEART FAILURE WITH PRESERVED EJECTION FRACTION: ICD-10-CM

## 2019-07-30 DIAGNOSIS — Z01.810 PREOP CARDIOVASCULAR EXAM: ICD-10-CM

## 2019-07-30 DIAGNOSIS — S72.142A CLOSED COMMINUTED INTERTROCHANTERIC FRACTURE OF LEFT FEMUR, INITIAL ENCOUNTER: Primary | ICD-10-CM

## 2019-07-30 DIAGNOSIS — S72.002A CLOSED LEFT HIP FRACTURE, INITIAL ENCOUNTER: ICD-10-CM

## 2019-07-30 LAB
ABO + RH BLD: NORMAL
ALBUMIN SERPL BCP-MCNC: 3 G/DL (ref 3.5–5.2)
ALP SERPL-CCNC: 64 U/L (ref 55–135)
ALT SERPL W/O P-5'-P-CCNC: 8 U/L (ref 10–44)
ANION GAP SERPL CALC-SCNC: 12 MMOL/L (ref 8–16)
APTT BLDCRRT: 28.3 SEC (ref 21–32)
AST SERPL-CCNC: 15 U/L (ref 10–40)
BASOPHILS # BLD AUTO: 0.02 K/UL (ref 0–0.2)
BASOPHILS NFR BLD: 0.2 % (ref 0–1.9)
BILIRUB SERPL-MCNC: 0.6 MG/DL (ref 0.1–1)
BILIRUB UR QL STRIP: NEGATIVE
BLD GP AB SCN CELLS X3 SERPL QL: NORMAL
BUN SERPL-MCNC: 8 MG/DL (ref 8–23)
CALCIUM SERPL-MCNC: 8.8 MG/DL (ref 8.7–10.5)
CHLORIDE SERPL-SCNC: 100 MMOL/L (ref 95–110)
CLARITY UR: CLEAR
CO2 SERPL-SCNC: 24 MMOL/L (ref 23–29)
COLOR UR: YELLOW
CREAT SERPL-MCNC: 0.6 MG/DL (ref 0.5–1.4)
DIFFERENTIAL METHOD: ABNORMAL
EOSINOPHIL # BLD AUTO: 0 K/UL (ref 0–0.5)
EOSINOPHIL NFR BLD: 0.4 % (ref 0–8)
ERYTHROCYTE [DISTWIDTH] IN BLOOD BY AUTOMATED COUNT: 14.9 % (ref 11.5–14.5)
EST. GFR  (AFRICAN AMERICAN): >60 ML/MIN/1.73 M^2
EST. GFR  (NON AFRICAN AMERICAN): >60 ML/MIN/1.73 M^2
GLUCOSE SERPL-MCNC: 123 MG/DL (ref 70–110)
GLUCOSE UR QL STRIP: NEGATIVE
GRAN CASTS #/AREA URNS LPF: 2 /LPF
HCT VFR BLD AUTO: 35.1 % (ref 37–48.5)
HGB BLD-MCNC: 11.6 G/DL (ref 12–16)
HGB UR QL STRIP: ABNORMAL
INR PPP: 1 (ref 0.8–1.2)
KETONES UR QL STRIP: ABNORMAL
LEUKOCYTE ESTERASE UR QL STRIP: NEGATIVE
LYMPHOCYTES # BLD AUTO: 0.8 K/UL (ref 1–4.8)
LYMPHOCYTES NFR BLD: 7.6 % (ref 18–48)
MCH RBC QN AUTO: 29.1 PG (ref 27–31)
MCHC RBC AUTO-ENTMCNC: 33 G/DL (ref 32–36)
MCV RBC AUTO: 88 FL (ref 82–98)
MICROSCOPIC COMMENT: ABNORMAL
MONOCYTES # BLD AUTO: 0.8 K/UL (ref 0.3–1)
MONOCYTES NFR BLD: 7.1 % (ref 4–15)
NEUTROPHILS # BLD AUTO: 9.2 K/UL (ref 1.8–7.7)
NEUTROPHILS NFR BLD: 85.1 % (ref 38–73)
NITRITE UR QL STRIP: NEGATIVE
PH UR STRIP: 6 [PH] (ref 5–8)
PLATELET # BLD AUTO: 332 K/UL (ref 150–350)
PMV BLD AUTO: 10.1 FL (ref 9.2–12.9)
POTASSIUM SERPL-SCNC: 3.1 MMOL/L (ref 3.5–5.1)
PROT SERPL-MCNC: 6.5 G/DL (ref 6–8.4)
PROT UR QL STRIP: NEGATIVE
PROTHROMBIN TIME: 10.7 SEC (ref 9–12.5)
RBC # BLD AUTO: 3.99 M/UL (ref 4–5.4)
RBC #/AREA URNS HPF: 3 /HPF (ref 0–4)
SODIUM SERPL-SCNC: 136 MMOL/L (ref 136–145)
SP GR UR STRIP: 1.01 (ref 1–1.03)
URN SPEC COLLECT METH UR: ABNORMAL
UROBILINOGEN UR STRIP-ACNC: NEGATIVE EU/DL
WBC # BLD AUTO: 10.91 K/UL (ref 3.9–12.7)

## 2019-07-30 PROCEDURE — 96374 THER/PROPH/DIAG INJ IV PUSH: CPT

## 2019-07-30 PROCEDURE — 85025 COMPLETE CBC W/AUTO DIFF WBC: CPT

## 2019-07-30 PROCEDURE — 85730 THROMBOPLASTIN TIME PARTIAL: CPT

## 2019-07-30 PROCEDURE — 80053 COMPREHEN METABOLIC PANEL: CPT

## 2019-07-30 PROCEDURE — 51702 INSERT TEMP BLADDER CATH: CPT

## 2019-07-30 PROCEDURE — 63600175 PHARM REV CODE 636 W HCPCS: Performed by: EMERGENCY MEDICINE

## 2019-07-30 PROCEDURE — 86850 RBC ANTIBODY SCREEN: CPT

## 2019-07-30 PROCEDURE — 12000002 HC ACUTE/MED SURGE SEMI-PRIVATE ROOM

## 2019-07-30 PROCEDURE — 11000001 HC ACUTE MED/SURG PRIVATE ROOM

## 2019-07-30 PROCEDURE — 99285 EMERGENCY DEPT VISIT HI MDM: CPT | Mod: 25

## 2019-07-30 PROCEDURE — 81000 URINALYSIS NONAUTO W/SCOPE: CPT

## 2019-07-30 PROCEDURE — 96376 TX/PRO/DX INJ SAME DRUG ADON: CPT

## 2019-07-30 PROCEDURE — 85610 PROTHROMBIN TIME: CPT

## 2019-07-30 PROCEDURE — 25000003 PHARM REV CODE 250: Performed by: EMERGENCY MEDICINE

## 2019-07-30 RX ORDER — SODIUM CHLORIDE 0.9 % (FLUSH) 0.9 %
10 SYRINGE (ML) INJECTION
Status: DISCONTINUED | OUTPATIENT
Start: 2019-07-30 | End: 2019-07-31

## 2019-07-30 RX ORDER — ONDANSETRON 2 MG/ML
4 INJECTION INTRAMUSCULAR; INTRAVENOUS EVERY 6 HOURS PRN
Status: DISCONTINUED | OUTPATIENT
Start: 2019-07-30 | End: 2019-07-31

## 2019-07-30 RX ORDER — HYDROMORPHONE HYDROCHLORIDE 2 MG/ML
0.25 INJECTION, SOLUTION INTRAMUSCULAR; INTRAVENOUS; SUBCUTANEOUS
Status: COMPLETED | OUTPATIENT
Start: 2019-07-30 | End: 2019-07-30

## 2019-07-30 RX ORDER — SODIUM CHLORIDE, SODIUM LACTATE, POTASSIUM CHLORIDE, CALCIUM CHLORIDE 600; 310; 30; 20 MG/100ML; MG/100ML; MG/100ML; MG/100ML
INJECTION, SOLUTION INTRAVENOUS CONTINUOUS
Status: DISCONTINUED | OUTPATIENT
Start: 2019-07-31 | End: 2019-07-31

## 2019-07-30 RX ORDER — POTASSIUM CHLORIDE 20 MEQ/15ML
40 SOLUTION ORAL ONCE
Status: COMPLETED | OUTPATIENT
Start: 2019-07-30 | End: 2019-07-30

## 2019-07-30 RX ORDER — MIRTAZAPINE 7.5 MG/1
7.5 TABLET, FILM COATED ORAL NIGHTLY
Status: DISCONTINUED | OUTPATIENT
Start: 2019-07-31 | End: 2019-08-02 | Stop reason: HOSPADM

## 2019-07-30 RX ORDER — HYDROMORPHONE HYDROCHLORIDE 2 MG/ML
0.5 INJECTION, SOLUTION INTRAMUSCULAR; INTRAVENOUS; SUBCUTANEOUS EVERY 6 HOURS PRN
Status: DISCONTINUED | OUTPATIENT
Start: 2019-07-30 | End: 2019-07-31

## 2019-07-30 RX ORDER — MORPHINE SULFATE 10 MG/ML
2 INJECTION INTRAMUSCULAR; INTRAVENOUS; SUBCUTANEOUS
Status: DISCONTINUED | OUTPATIENT
Start: 2019-07-30 | End: 2019-07-30

## 2019-07-30 RX ORDER — MIRTAZAPINE 15 MG/1
7.5 TABLET, FILM COATED ORAL NIGHTLY
COMMUNITY

## 2019-07-30 RX ORDER — LORAZEPAM 2 MG/ML
0.5 INJECTION INTRAMUSCULAR
Status: COMPLETED | OUTPATIENT
Start: 2019-07-30 | End: 2019-07-30

## 2019-07-30 RX ORDER — ACETAMINOPHEN 325 MG/1
650 TABLET ORAL EVERY 4 HOURS PRN
Status: DISCONTINUED | OUTPATIENT
Start: 2019-07-30 | End: 2019-07-31

## 2019-07-30 RX ORDER — HYDROMORPHONE HYDROCHLORIDE 2 MG/ML
0.25 INJECTION, SOLUTION INTRAMUSCULAR; INTRAVENOUS; SUBCUTANEOUS EVERY 6 HOURS PRN
Status: DISCONTINUED | OUTPATIENT
Start: 2019-07-30 | End: 2019-07-31

## 2019-07-30 RX ADMIN — HYDROMORPHONE HYDROCHLORIDE 0.25 MG: 2 INJECTION, SOLUTION INTRAMUSCULAR; INTRAVENOUS; SUBCUTANEOUS at 09:07

## 2019-07-30 RX ADMIN — POTASSIUM CHLORIDE 40 MEQ: 20 SOLUTION ORAL at 09:07

## 2019-07-30 RX ADMIN — HYDROMORPHONE HYDROCHLORIDE 0.25 MG: 2 INJECTION, SOLUTION INTRAMUSCULAR; INTRAVENOUS; SUBCUTANEOUS at 08:07

## 2019-07-30 RX ADMIN — LORAZEPAM 0.5 MG: 2 INJECTION INTRAMUSCULAR; INTRAVENOUS at 11:07

## 2019-07-31 ENCOUNTER — ANESTHESIA EVENT (OUTPATIENT)
Dept: SURGERY | Facility: HOSPITAL | Age: 84
DRG: 481 | End: 2019-07-31
Payer: MEDICARE

## 2019-07-31 ENCOUNTER — ANESTHESIA (OUTPATIENT)
Dept: SURGERY | Facility: HOSPITAL | Age: 84
DRG: 481 | End: 2019-07-31
Payer: MEDICARE

## 2019-07-31 PROBLEM — Z86.73 HISTORY OF TRANSIENT ISCHEMIC ATTACK (TIA): Chronic | Status: ACTIVE | Noted: 2019-07-31

## 2019-07-31 PROBLEM — D63.8 ANEMIA OF CHRONIC DISEASE: Chronic | Status: ACTIVE | Noted: 2019-07-31

## 2019-07-31 PROBLEM — Z85.118 HISTORY OF LUNG CANCER: Chronic | Status: ACTIVE | Noted: 2019-07-31

## 2019-07-31 PROBLEM — J44.9 CHRONIC OBSTRUCTIVE PULMONARY DISEASE: Chronic | Status: ACTIVE | Noted: 2019-07-31

## 2019-07-31 PROBLEM — Z86.73 HISTORY OF TRANSIENT ISCHEMIC ATTACK (TIA): Status: ACTIVE | Noted: 2019-07-31

## 2019-07-31 PROBLEM — J44.9 CHRONIC OBSTRUCTIVE PULMONARY DISEASE: Status: ACTIVE | Noted: 2019-07-31

## 2019-07-31 PROBLEM — Z85.118 HISTORY OF LUNG CANCER: Status: ACTIVE | Noted: 2019-07-31
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
## 2019-07-31 PROBLEM — E87.6 HYPOKALEMIA: Status: ACTIVE | Noted: 2019-07-31

## 2019-07-31 PROBLEM — J96.11 CHRONIC RESPIRATORY FAILURE WITH HYPOXIA: Chronic | Status: ACTIVE | Noted: 2019-07-31

## 2019-07-31 PROBLEM — E03.9 ACQUIRED HYPOTHYROIDISM: Chronic | Status: ACTIVE | Noted: 2017-01-08

## 2019-07-31 PROBLEM — S72.142A CLOSED COMMINUTED INTERTROCHANTERIC FRACTURE OF LEFT FEMUR: Status: ACTIVE | Noted: 2019-07-30

## 2019-07-31 PROBLEM — D63.8 ANEMIA OF CHRONIC DISEASE: Status: ACTIVE | Noted: 2019-07-31

## 2019-07-31 PROBLEM — J96.11 CHRONIC RESPIRATORY FAILURE WITH HYPOXIA: Status: ACTIVE | Noted: 2019-07-31

## 2019-07-31 LAB
ALBUMIN SERPL BCP-MCNC: 2.6 G/DL (ref 3.5–5.2)
ALP SERPL-CCNC: 55 U/L (ref 55–135)
ALT SERPL W/O P-5'-P-CCNC: 11 U/L (ref 10–44)
ANION GAP SERPL CALC-SCNC: 5 MMOL/L (ref 8–16)
AST SERPL-CCNC: 12 U/L (ref 10–40)
BASOPHILS # BLD AUTO: 0.01 K/UL (ref 0–0.2)
BASOPHILS NFR BLD: 0.1 % (ref 0–1.9)
BILIRUB SERPL-MCNC: 0.6 MG/DL (ref 0.1–1)
BNP SERPL-MCNC: 123 PG/ML (ref 0–99)
BUN SERPL-MCNC: 8 MG/DL (ref 8–23)
CALCIUM SERPL-MCNC: 8.7 MG/DL (ref 8.7–10.5)
CHLORIDE SERPL-SCNC: 100 MMOL/L (ref 95–110)
CO2 SERPL-SCNC: 29 MMOL/L (ref 23–29)
CREAT SERPL-MCNC: 0.6 MG/DL (ref 0.5–1.4)
DIFFERENTIAL METHOD: ABNORMAL
EOSINOPHIL # BLD AUTO: 0 K/UL (ref 0–0.5)
EOSINOPHIL NFR BLD: 0.2 % (ref 0–8)
ERYTHROCYTE [DISTWIDTH] IN BLOOD BY AUTOMATED COUNT: 15.1 % (ref 11.5–14.5)
EST. GFR  (AFRICAN AMERICAN): >60 ML/MIN/1.73 M^2
EST. GFR  (NON AFRICAN AMERICAN): >60 ML/MIN/1.73 M^2
GLUCOSE SERPL-MCNC: 130 MG/DL (ref 70–110)
HCT VFR BLD AUTO: 33.1 % (ref 37–48.5)
HGB BLD-MCNC: 10.5 G/DL (ref 12–16)
LYMPHOCYTES # BLD AUTO: 1.4 K/UL (ref 1–4.8)
LYMPHOCYTES NFR BLD: 11.7 % (ref 18–48)
MAGNESIUM SERPL-MCNC: 2 MG/DL (ref 1.6–2.6)
MCH RBC QN AUTO: 28.3 PG (ref 27–31)
MCHC RBC AUTO-ENTMCNC: 31.7 G/DL (ref 32–36)
MCV RBC AUTO: 89 FL (ref 82–98)
MONOCYTES # BLD AUTO: 1 K/UL (ref 0.3–1)
MONOCYTES NFR BLD: 8.4 % (ref 4–15)
NEUTROPHILS # BLD AUTO: 9.6 K/UL (ref 1.8–7.7)
NEUTROPHILS NFR BLD: 79.9 % (ref 38–73)
PHOSPHATE SERPL-MCNC: 3.5 MG/DL (ref 2.7–4.5)
PLATELET # BLD AUTO: 286 K/UL (ref 150–350)
PMV BLD AUTO: 9.5 FL (ref 9.2–12.9)
POTASSIUM SERPL-SCNC: 4.4 MMOL/L (ref 3.5–5.1)
PROT SERPL-MCNC: 5.8 G/DL (ref 6–8.4)
RBC # BLD AUTO: 3.71 M/UL (ref 4–5.4)
SODIUM SERPL-SCNC: 134 MMOL/L (ref 136–145)
WBC # BLD AUTO: 12.02 K/UL (ref 3.9–12.7)

## 2019-07-31 PROCEDURE — D9220A PRA ANESTHESIA: ICD-10-PCS | Mod: CRNA,,, | Performed by: NURSE ANESTHETIST, CERTIFIED REGISTERED

## 2019-07-31 PROCEDURE — 27201423 OPTIME MED/SURG SUP & DEVICES STERILE SUPPLY: Performed by: ORTHOPAEDIC SURGERY

## 2019-07-31 PROCEDURE — D9220A PRA ANESTHESIA: ICD-10-PCS | Mod: ANES,,, | Performed by: ANESTHESIOLOGY

## 2019-07-31 PROCEDURE — 94761 N-INVAS EAR/PLS OXIMETRY MLT: CPT

## 2019-07-31 PROCEDURE — 25000003 PHARM REV CODE 250: Performed by: NURSE ANESTHETIST, CERTIFIED REGISTERED

## 2019-07-31 PROCEDURE — 83735 ASSAY OF MAGNESIUM: CPT

## 2019-07-31 PROCEDURE — 85025 COMPLETE CBC W/AUTO DIFF WBC: CPT

## 2019-07-31 PROCEDURE — 63600175 PHARM REV CODE 636 W HCPCS: Performed by: NURSE ANESTHETIST, CERTIFIED REGISTERED

## 2019-07-31 PROCEDURE — 63600175 PHARM REV CODE 636 W HCPCS: Performed by: EMERGENCY MEDICINE

## 2019-07-31 PROCEDURE — 36415 COLL VENOUS BLD VENIPUNCTURE: CPT

## 2019-07-31 PROCEDURE — 25000242 PHARM REV CODE 250 ALT 637 W/ HCPCS: Performed by: ORTHOPAEDIC SURGERY

## 2019-07-31 PROCEDURE — 25000003 PHARM REV CODE 250: Performed by: INTERNAL MEDICINE

## 2019-07-31 PROCEDURE — 94640 AIRWAY INHALATION TREATMENT: CPT

## 2019-07-31 PROCEDURE — 83880 ASSAY OF NATRIURETIC PEPTIDE: CPT

## 2019-07-31 PROCEDURE — 27000221 HC OXYGEN, UP TO 24 HOURS

## 2019-07-31 PROCEDURE — C1713 ANCHOR/SCREW BN/BN,TIS/BN: HCPCS | Performed by: ORTHOPAEDIC SURGERY

## 2019-07-31 PROCEDURE — D9220A PRA ANESTHESIA: Mod: ANES,,, | Performed by: ANESTHESIOLOGY

## 2019-07-31 PROCEDURE — 63600175 PHARM REV CODE 636 W HCPCS: Performed by: ORTHOPAEDIC SURGERY

## 2019-07-31 PROCEDURE — 80053 COMPREHEN METABOLIC PANEL: CPT

## 2019-07-31 PROCEDURE — 93010 ELECTROCARDIOGRAM REPORT: CPT | Mod: ,,, | Performed by: INTERNAL MEDICINE

## 2019-07-31 PROCEDURE — 25000003 PHARM REV CODE 250: Performed by: ORTHOPAEDIC SURGERY

## 2019-07-31 PROCEDURE — D9220A PRA ANESTHESIA: Mod: CRNA,,, | Performed by: NURSE ANESTHETIST, CERTIFIED REGISTERED

## 2019-07-31 PROCEDURE — 36000711: Performed by: ORTHOPAEDIC SURGERY

## 2019-07-31 PROCEDURE — 27200651 HC AIRWAY, LMA: Performed by: NURSE ANESTHETIST, CERTIFIED REGISTERED

## 2019-07-31 PROCEDURE — 84100 ASSAY OF PHOSPHORUS: CPT

## 2019-07-31 PROCEDURE — 25000242 PHARM REV CODE 250 ALT 637 W/ HCPCS: Performed by: ANESTHESIOLOGY

## 2019-07-31 PROCEDURE — 93010 EKG 12-LEAD: ICD-10-PCS | Mod: ,,, | Performed by: INTERNAL MEDICINE

## 2019-07-31 PROCEDURE — 71000033 HC RECOVERY, INTIAL HOUR: Performed by: ORTHOPAEDIC SURGERY

## 2019-07-31 PROCEDURE — 63600175 PHARM REV CODE 636 W HCPCS: Performed by: ANESTHESIOLOGY

## 2019-07-31 PROCEDURE — 63600175 PHARM REV CODE 636 W HCPCS: Performed by: INTERNAL MEDICINE

## 2019-07-31 PROCEDURE — 93005 ELECTROCARDIOGRAM TRACING: CPT

## 2019-07-31 PROCEDURE — 94799 UNLISTED PULMONARY SVC/PX: CPT

## 2019-07-31 PROCEDURE — 37000009 HC ANESTHESIA EA ADD 15 MINS: Performed by: ORTHOPAEDIC SURGERY

## 2019-07-31 PROCEDURE — 36000710: Performed by: ORTHOPAEDIC SURGERY

## 2019-07-31 PROCEDURE — 37000008 HC ANESTHESIA 1ST 15 MINUTES: Performed by: ORTHOPAEDIC SURGERY

## 2019-07-31 PROCEDURE — C1769 GUIDE WIRE: HCPCS | Performed by: ORTHOPAEDIC SURGERY

## 2019-07-31 PROCEDURE — 11000001 HC ACUTE MED/SURG PRIVATE ROOM

## 2019-07-31 DEVICE — NAIL IM CANN 130 DEG 11X360 L: Type: IMPLANTABLE DEVICE | Site: HIP | Status: FUNCTIONAL

## 2019-07-31 DEVICE — SCREW STRDRV REC T25 5X36 TTNM: Type: IMPLANTABLE DEVICE | Site: HIP | Status: FUNCTIONAL

## 2019-07-31 RX ORDER — POTASSIUM CHLORIDE 20 MEQ/1
40 TABLET, EXTENDED RELEASE ORAL ONCE
Status: DISCONTINUED | OUTPATIENT
Start: 2019-07-31 | End: 2019-08-02 | Stop reason: HOSPADM

## 2019-07-31 RX ORDER — SODIUM CHLORIDE 9 MG/ML
INJECTION, SOLUTION INTRAVENOUS CONTINUOUS PRN
Status: DISCONTINUED | OUTPATIENT
Start: 2019-07-31 | End: 2019-07-31

## 2019-07-31 RX ORDER — DEXTROSE MONOHYDRATE AND SODIUM CHLORIDE 5; .9 G/100ML; G/100ML
INJECTION, SOLUTION INTRAVENOUS CONTINUOUS
Status: DISCONTINUED | OUTPATIENT
Start: 2019-07-31 | End: 2019-08-01

## 2019-07-31 RX ORDER — CEFAZOLIN SODIUM 1 G/50ML
1 SOLUTION INTRAVENOUS
Status: COMPLETED | OUTPATIENT
Start: 2019-07-31 | End: 2019-08-01

## 2019-07-31 RX ORDER — ACETAMINOPHEN 500 MG
500 TABLET ORAL EVERY 6 HOURS PRN
Status: DISCONTINUED | OUTPATIENT
Start: 2019-07-31 | End: 2019-07-31

## 2019-07-31 RX ORDER — ACETAMINOPHEN 325 MG/1
650 TABLET ORAL EVERY 6 HOURS
Status: DISCONTINUED | OUTPATIENT
Start: 2019-07-31 | End: 2019-08-02 | Stop reason: HOSPADM

## 2019-07-31 RX ORDER — ENOXAPARIN SODIUM 100 MG/ML
30 INJECTION SUBCUTANEOUS EVERY 24 HOURS
Status: DISCONTINUED | OUTPATIENT
Start: 2019-08-01 | End: 2019-08-01

## 2019-07-31 RX ORDER — IPRATROPIUM BROMIDE AND ALBUTEROL SULFATE 2.5; .5 MG/3ML; MG/3ML
3 SOLUTION RESPIRATORY (INHALATION) EVERY 6 HOURS PRN
Status: DISCONTINUED | OUTPATIENT
Start: 2019-07-31 | End: 2019-08-02 | Stop reason: HOSPADM

## 2019-07-31 RX ORDER — ACETAMINOPHEN 10 MG/ML
650 INJECTION, SOLUTION INTRAVENOUS ONCE
Status: COMPLETED | OUTPATIENT
Start: 2019-07-31 | End: 2019-07-31

## 2019-07-31 RX ORDER — SODIUM CHLORIDE, SODIUM LACTATE, POTASSIUM CHLORIDE, CALCIUM CHLORIDE 600; 310; 30; 20 MG/100ML; MG/100ML; MG/100ML; MG/100ML
INJECTION, SOLUTION INTRAVENOUS CONTINUOUS
Status: DISCONTINUED | OUTPATIENT
Start: 2019-07-31 | End: 2019-07-31

## 2019-07-31 RX ORDER — ENOXAPARIN SODIUM 100 MG/ML
40 INJECTION SUBCUTANEOUS EVERY 24 HOURS
Status: DISCONTINUED | OUTPATIENT
Start: 2019-07-31 | End: 2019-07-31

## 2019-07-31 RX ORDER — HYDROMORPHONE HYDROCHLORIDE 2 MG/ML
0.5 INJECTION, SOLUTION INTRAMUSCULAR; INTRAVENOUS; SUBCUTANEOUS EVERY 6 HOURS PRN
Status: DISCONTINUED | OUTPATIENT
Start: 2019-07-31 | End: 2019-07-31

## 2019-07-31 RX ORDER — GUAIFENESIN/DEXTROMETHORPHAN 100-10MG/5
15 SYRUP ORAL EVERY 6 HOURS PRN
Status: DISCONTINUED | OUTPATIENT
Start: 2019-07-31 | End: 2019-08-02 | Stop reason: HOSPADM

## 2019-07-31 RX ORDER — PROMETHAZINE HYDROCHLORIDE 6.25 MG/5ML
6.25 SYRUP ORAL EVERY 6 HOURS PRN
Status: DISCONTINUED | OUTPATIENT
Start: 2019-07-31 | End: 2019-08-02 | Stop reason: HOSPADM

## 2019-07-31 RX ORDER — FENTANYL CITRATE 50 UG/ML
INJECTION, SOLUTION INTRAMUSCULAR; INTRAVENOUS
Status: DISCONTINUED | OUTPATIENT
Start: 2019-07-31 | End: 2019-07-31

## 2019-07-31 RX ORDER — PROMETHAZINE HYDROCHLORIDE AND DEXTROMETHORPHAN HYDROBROMIDE 6.25; 15 MG/5ML; MG/5ML
5 SYRUP ORAL
COMMUNITY

## 2019-07-31 RX ORDER — LEVOTHYROXINE SODIUM 25 UG/1
25 TABLET ORAL
Status: DISCONTINUED | OUTPATIENT
Start: 2019-07-31 | End: 2019-08-01

## 2019-07-31 RX ORDER — OXYCODONE HYDROCHLORIDE 5 MG/1
5 TABLET ORAL EVERY 6 HOURS PRN
Status: DISCONTINUED | OUTPATIENT
Start: 2019-07-31 | End: 2019-08-01

## 2019-07-31 RX ORDER — TRAMADOL HYDROCHLORIDE 50 MG/1
50 TABLET ORAL EVERY 6 HOURS PRN
Status: DISCONTINUED | OUTPATIENT
Start: 2019-07-31 | End: 2019-08-02 | Stop reason: HOSPADM

## 2019-07-31 RX ORDER — LIOTHYRONINE SODIUM 5 UG/1
5 TABLET ORAL DAILY
Status: DISCONTINUED | OUTPATIENT
Start: 2019-07-31 | End: 2019-08-02 | Stop reason: HOSPADM

## 2019-07-31 RX ORDER — EPHEDRINE SULFATE 50 MG/ML
INJECTION, SOLUTION INTRAVENOUS
Status: DISCONTINUED | OUTPATIENT
Start: 2019-07-31 | End: 2019-07-31

## 2019-07-31 RX ORDER — IPRATROPIUM BROMIDE AND ALBUTEROL SULFATE 2.5; .5 MG/3ML; MG/3ML
3 SOLUTION RESPIRATORY (INHALATION) ONCE
Status: COMPLETED | OUTPATIENT
Start: 2019-07-31 | End: 2019-07-31

## 2019-07-31 RX ORDER — LIDOCAINE HCL/PF 100 MG/5ML
SYRINGE (ML) INTRAVENOUS
Status: DISCONTINUED | OUTPATIENT
Start: 2019-07-31 | End: 2019-07-31

## 2019-07-31 RX ORDER — ONDANSETRON 2 MG/ML
8 INJECTION INTRAMUSCULAR; INTRAVENOUS EVERY 8 HOURS PRN
Status: DISCONTINUED | OUTPATIENT
Start: 2019-07-31 | End: 2019-08-02 | Stop reason: HOSPADM

## 2019-07-31 RX ORDER — SODIUM CHLORIDE 0.9 % (FLUSH) 0.9 %
3 SYRINGE (ML) INJECTION
Status: DISCONTINUED | OUTPATIENT
Start: 2019-07-31 | End: 2019-07-31 | Stop reason: HOSPADM

## 2019-07-31 RX ORDER — HYDROMORPHONE HYDROCHLORIDE 2 MG/ML
0.1 INJECTION, SOLUTION INTRAMUSCULAR; INTRAVENOUS; SUBCUTANEOUS EVERY 5 MIN PRN
Status: DISCONTINUED | OUTPATIENT
Start: 2019-07-31 | End: 2019-07-31 | Stop reason: HOSPADM

## 2019-07-31 RX ORDER — IPRATROPIUM BROMIDE AND ALBUTEROL SULFATE 2.5; .5 MG/3ML; MG/3ML
3 SOLUTION RESPIRATORY (INHALATION)
Status: DISCONTINUED | OUTPATIENT
Start: 2019-07-31 | End: 2019-08-02 | Stop reason: HOSPADM

## 2019-07-31 RX ORDER — PROPOFOL 10 MG/ML
VIAL (ML) INTRAVENOUS
Status: DISCONTINUED | OUTPATIENT
Start: 2019-07-31 | End: 2019-07-31

## 2019-07-31 RX ORDER — MORPHINE SULFATE 10 MG/ML
2 INJECTION INTRAMUSCULAR; INTRAVENOUS; SUBCUTANEOUS EVERY 4 HOURS PRN
Status: DISCONTINUED | OUTPATIENT
Start: 2019-07-31 | End: 2019-08-01

## 2019-07-31 RX ORDER — PHENYLEPHRINE HYDROCHLORIDE 10 MG/ML
INJECTION INTRAVENOUS
Status: DISCONTINUED | OUTPATIENT
Start: 2019-07-31 | End: 2019-07-31

## 2019-07-31 RX ORDER — PROMETHAZINE HYDROCHLORIDE AND CODEINE PHOSPHATE 6.25; 1 MG/5ML; MG/5ML
5 SOLUTION ORAL EVERY 4 HOURS PRN
Status: ON HOLD | COMMUNITY
End: 2019-07-31 | Stop reason: CLARIF

## 2019-07-31 RX ORDER — FENTANYL CITRATE 50 UG/ML
25 INJECTION, SOLUTION INTRAMUSCULAR; INTRAVENOUS EVERY 5 MIN PRN
Status: DISCONTINUED | OUTPATIENT
Start: 2019-07-31 | End: 2019-07-31 | Stop reason: HOSPADM

## 2019-07-31 RX ORDER — AMOXICILLIN 250 MG
1 CAPSULE ORAL 2 TIMES DAILY
Status: DISCONTINUED | OUTPATIENT
Start: 2019-07-31 | End: 2019-08-01

## 2019-07-31 RX ADMIN — GUAIFENESIN AND DEXTROMETHORPHAN 15 MG: 100; 10 SYRUP ORAL at 09:07

## 2019-07-31 RX ADMIN — FENTANYL CITRATE 25 MCG: 50 INJECTION INTRAMUSCULAR; INTRAVENOUS at 11:07

## 2019-07-31 RX ADMIN — CEFAZOLIN SODIUM 1 G: 1 SOLUTION INTRAVENOUS at 10:07

## 2019-07-31 RX ADMIN — PROPOFOL 70 MG: 10 INJECTION, EMULSION INTRAVENOUS at 12:07

## 2019-07-31 RX ADMIN — ACETAMINOPHEN 650 MG: 325 TABLET, FILM COATED ORAL at 06:07

## 2019-07-31 RX ADMIN — CEFAZOLIN SODIUM 2 G: 1 POWDER, FOR SOLUTION INTRAMUSCULAR; INTRAVENOUS at 12:07

## 2019-07-31 RX ADMIN — IPRATROPIUM BROMIDE AND ALBUTEROL SULFATE 3 ML: .5; 3 SOLUTION RESPIRATORY (INHALATION) at 11:07

## 2019-07-31 RX ADMIN — SODIUM CHLORIDE: 0.9 INJECTION, SOLUTION INTRAVENOUS at 12:07

## 2019-07-31 RX ADMIN — MIRTAZAPINE 7.5 MG: 7.5 TABLET ORAL at 09:07

## 2019-07-31 RX ADMIN — SENNOSIDES, DOCUSATE SODIUM 1 TABLET: 50; 8.6 TABLET, FILM COATED ORAL at 09:07

## 2019-07-31 RX ADMIN — SODIUM CHLORIDE: 0.9 INJECTION, SOLUTION INTRAVENOUS at 01:07

## 2019-07-31 RX ADMIN — PHENYLEPHRINE HYDROCHLORIDE 200 MCG: 10 INJECTION INTRAVENOUS at 01:07

## 2019-07-31 RX ADMIN — CEFAZOLIN SODIUM 1 G: 1 SOLUTION INTRAVENOUS at 06:07

## 2019-07-31 RX ADMIN — PHENYLEPHRINE HYDROCHLORIDE 200 MCG: 10 INJECTION INTRAVENOUS at 12:07

## 2019-07-31 RX ADMIN — OXYCODONE HYDROCHLORIDE 5 MG: 5 TABLET ORAL at 06:07

## 2019-07-31 RX ADMIN — EPHEDRINE SULFATE 10 MG: 50 INJECTION, SOLUTION INTRAMUSCULAR; INTRAVENOUS; SUBCUTANEOUS at 01:07

## 2019-07-31 RX ADMIN — HYDROMORPHONE HYDROCHLORIDE 0.1 MG: 2 INJECTION, SOLUTION INTRAMUSCULAR; INTRAVENOUS; SUBCUTANEOUS at 02:07

## 2019-07-31 RX ADMIN — ACETAMINOPHEN 650 MG: 10 INJECTION, SOLUTION INTRAVENOUS at 02:07

## 2019-07-31 RX ADMIN — ACETAMINOPHEN 650 MG: 325 TABLET, FILM COATED ORAL at 11:07

## 2019-07-31 RX ADMIN — DEXTROSE AND SODIUM CHLORIDE: 5; .9 INJECTION, SOLUTION INTRAVENOUS at 04:07

## 2019-07-31 RX ADMIN — FENTANYL CITRATE 10 MCG: 50 INJECTION INTRAMUSCULAR; INTRAVENOUS at 12:07

## 2019-07-31 RX ADMIN — MORPHINE SULFATE 2 MG: 10 INJECTION INTRAVENOUS at 09:07

## 2019-07-31 RX ADMIN — HYDROMORPHONE HYDROCHLORIDE 0.5 MG: 2 INJECTION, SOLUTION INTRAMUSCULAR; INTRAVENOUS; SUBCUTANEOUS at 08:07

## 2019-07-31 RX ADMIN — PROMETHAZINE HYDROCHLORIDE 6.25 MG: 6.25 SOLUTION ORAL at 09:07

## 2019-07-31 RX ADMIN — OXYCODONE HYDROCHLORIDE 5 MG: 5 TABLET ORAL at 05:07

## 2019-07-31 RX ADMIN — PHENYLEPHRINE HYDROCHLORIDE 100 MCG: 10 INJECTION INTRAVENOUS at 12:07

## 2019-07-31 RX ADMIN — LEVOTHYROXINE SODIUM 25 MCG: 25 TABLET ORAL at 05:07

## 2019-07-31 RX ADMIN — HYDROMORPHONE HYDROCHLORIDE 0.1 MG: 2 INJECTION, SOLUTION INTRAMUSCULAR; INTRAVENOUS; SUBCUTANEOUS at 01:07

## 2019-07-31 RX ADMIN — IPRATROPIUM BROMIDE AND ALBUTEROL SULFATE 3 ML: .5; 3 SOLUTION RESPIRATORY (INHALATION) at 07:07

## 2019-07-31 RX ADMIN — LIDOCAINE HYDROCHLORIDE 50 MG: 20 INJECTION, SOLUTION INTRAVENOUS at 12:07

## 2019-07-31 RX ADMIN — FENTANYL CITRATE 25 MCG: 50 INJECTION INTRAMUSCULAR; INTRAVENOUS at 12:07

## 2019-07-31 NOTE — TRANSFER OF CARE
"Anesthesia Transfer of Care Note    Patient: Lena Driscoll    Procedure(s) Performed: Procedure(s) (LRB):  ORIF, FRACTURE, FEMUR IM LONG NAIL TFN SYNTHES REP NOTIFIED BY DR. NEWELL (Left)    Patient location: PACU    Anesthesia Type: general    Transport from OR: Transported from OR on room air with adequate spontaneous ventilation    Post pain: adequate analgesia    Post assessment: no apparent anesthetic complications    Post vital signs: stable    Level of consciousness: awake and alert    Nausea/Vomiting: no nausea/vomiting    Complications: none    Transfer of care protocol was followed      Last vitals:   Visit Vitals  BP (!) 120/58 (BP Location: Right arm, Patient Position: Sitting)   Pulse 96   Temp 36.6 °C (97.9 °F) (Oral)   Resp 20   Ht 4' 11" (1.499 m)   Wt 46.4 kg (102 lb 4.7 oz)   SpO2 100%   Breastfeeding? No   BMI 20.66 kg/m²     "

## 2019-07-31 NOTE — NURSING
Report received from DEANNE Parker RN. Patient lying in bed sleeping comfortably.  No acute cardiac or respiratory distress noted. Safety measures maintained; wheels locked, bed in lowest position, bed alarm active and engaged, and call light in reach. Will continue to monitor.

## 2019-07-31 NOTE — OR NURSING
Tolerated Duo-Neb treatment without difficulty.  Patient continues to have mildly productive cough of white secretions.

## 2019-07-31 NOTE — HPI
Mrs. Lena Driscoll is a 84 y.o. female [facility] resident with COPD, chronic respiratory failure with hypoxia, hypothyroidism (TSH 0.08 Jan 2017), anemia of chronic disease, history of lung cancer, and history of TIA who presents to [facility] ED with complaints of fall today.  She complained of left hip pain after falling.  The fall was unwitnessed but she did report some head trauma without loss of consciousness.  She normally ambulates with a walker.  Further history is otherwise limited at this time due to her receiving a benzodiazepine prior to transfer to this unit.

## 2019-07-31 NOTE — OP NOTE
DATE OF PROCEDURE:  07/31/2019    SURGEON:  Monik Paul III, M.D.    ASSISTANT:  Michelle Celaya LPN    PREOPERATIVE DIAGNOSIS:  Left hip intertrochanteric fracture, comminuted.    POSTOPERATIVE DIAGNOSIS:  Left hip intertrochanteric fracture, comminuted.    PROCEDURE:  IM nailing, left hip intertrochanteric fracture.    INDICATION:  The patient is an 84-year-old female with a history of a left hip   intertrochanteric fracture following a fall.  She was evaluated and admitted.  I   was consulted for management.  She had a history of COPD and previous lung   cancer with a baseline pulmonary effusion.  She was evaluated and cleared by   Medicine.  I spoke to her primary physician as well who recommended she undergo   surgery.  This was discussed with Anesthesia and plans were made to attempt   spinal anesthetic.    PROCEDURE IN DETAIL:  After proper informed consent was obtained, the patient   was brought to the Operating Room, placed supine on the operating table.  Spinal   anesthesia was attempted, but was not successful.  General anesthesia with a   laryngeal mask airway was induced and then she was placed in traction against   perineal post.  All pressure points well padded.  Hip was close reduced under   fluoroscopy and then prepped and draped.  An incision was carried out proximally   and dissection taken down the greater trochanter and guidewire was placed in   the trochanter and then it was reamed with the awl and was measured and then   reamed to 12.5 mm in diameter.  An 11 x 360 left TFN was inserted over the   guidewire, was impacted in position.  The alignment was adjusted with some additional   traction.  Pin was placed in the center position in the femoral head with a   proximal locking guide.  It was measured, reamed and then a 95 mm helical blade   was impacted into position.  It was locked for rotation and allowed to slide.    The single screw was placed distally with perfect circles technique.   The guides   were removed.  It was visualized under fluoroscopy and found to be appropriate   reduction of fracture and placement of hardware.  The wounds were then   thoroughly irrigated and hemostasis obtained with electrocautery.  It was closed   with 0 Vicryl, 2-0 Vicryl and staples.  She was placed in sterile dressing and   brought to Recovery in stable condition after extubation.  Estimated blood loss   75 mL.  No complications were noted.        KQR/HN  dd: 07/31/2019 13:44:09 (CDT)  td: 07/31/2019 15:27:01 (CDT)  Doc ID   #[id number]  Job ID #[id number]    CC:

## 2019-07-31 NOTE — NURSING TRANSFER
Nursing Transfer Note      7/31/2019     Transfer from PACU    Transfer via bed    Transfer with O2    Transported by Surgery transport    Medicines sent: None    Chart send with patient: {YES     Notified: QUIQUE Mckinnon

## 2019-07-31 NOTE — ANESTHESIA PREPROCEDURE EVALUATION
07/31/2019  Lena Driscoll is a 84 y.o., female.    Anesthesia Evaluation     I have reviewed the Nursing Notes.      Review of Systems  Anesthesia Hx:  No problems with previous Anesthesia   Social:  Former Smoker    Hematology/Oncology:        Current/Recent Cancer. (lung cancer)   Cardiovascular:   Exercise tolerance: poor Denies Pacemaker.  Denies Hypertension.  Denies Valvular problems/Murmurs.  Denies MI.  Denies CAD.    Denies CABG/stent.  Denies Dysrhythmias.   Denies Angina.             denies PVD no hyperlipidemia    Pulmonary:   Denies Pneumonia COPD Denies Asthma.  Denies Shortness of breath.  Denies Sleep Apnea. Hx lung ca   Renal/:  Renal/ Normal     Hepatic/GI:  Hepatic/GI Normal    Musculoskeletal:   Hx back fx   Neurological:   TIA, Denies Seizures.    Endocrine:   Denies Diabetes. Hypothyroidism        Physical Exam  General:  Well nourished    Airway/Jaw/Neck:  AIRWAY FINDINGS: Normal      Chest/Lungs:  Chest/Lungs Clear    Heart/Vascular:  Heart Findings: Normal       Mental Status:  Mental Status Findings: Normal        Anesthesia Plan  Type of Anesthesia, risks & benefits discussed:  Anesthesia Type:  general, spinal  Patient's Preference:   Intra-op Monitoring Plan: standard ASA monitors  Intra-op Monitoring Plan Comments:   Post Op Pain Control Plan:   Post Op Pain Control Plan Comments:   Induction:   IV  Beta Blocker:  Patient is not currently on a Beta-Blocker (No further documentation required).       Informed Consent: Patient representative understands risks and agrees with Anesthesia plan.  Questions answered. Anesthesia consent signed with patient representative.  ASA Score: 3     Day of Surgery Review of History & Physical:  There are no significant changes.  H&P update referred to the provider.     Anesthesia Plan Notes: Spoke with samuel at 9am to evaluate cxr with rt side  effusion..p        Ready For Surgery From Anesthesia Perspective.

## 2019-07-31 NOTE — ASSESSMENT & PLAN NOTE
Poorly controlled; will continue her home regimen of levothyroxine and liothyronine and deferred dosage adjustments to her PCP.

## 2019-07-31 NOTE — NURSING
Patient arrived to unit from surgery via bed, accompanied by staff and family. Dressing to L leg clean, dry, and intact. Patient oriented to room and call light system. Bed in low position, wheels locked, alarms on and audible.  Potty, position, and pain needs addressed.  No acute distress noted;  Will continue to monitor.

## 2019-07-31 NOTE — BRIEF OP NOTE
Operative Note         SUMMARY     Surgery Date: 7/31/2019     Surgeon(s) and Role:     * Monik Paul III, MD - Primary    Assistant:  Michelle Celaya, CST, LPN    Pre-op Diagnosis:  Closed left hip fracture [S72.002A]    Post-op Diagnosis: same    Procedure:  IM nailing L hip       Anesthesia: gen    Findings/Key Components:      Estimated Blood Loss: 75ml         Specimens (From admission, onward)    None

## 2019-07-31 NOTE — H&P
[facility] Medicine  History & Physical    Patient Name: Lena Driscoll  MRN: [id number]  Admission Date: 7/30/2019  Attending Physician: Atul Mcduffie MD   Primary Care Provider: Jeyson Garsia MD         Patient information was obtained from ER records.     Subjective:     Principal Problem:Closed comminuted intertrochanteric fracture of left femur    Chief Complaint:  Fall with left hip pain today.    HPI: Mrs. Lena Driscoll is a 84 y.o. female [facility] resident with COPD, chronic respiratory failure with hypoxia, hypothyroidism (TSH 0.08 Jan 2017), anemia of chronic disease, history of lung cancer, and history of TIA who presents to [facility] ED with complaints of fall today.  She complained of left hip pain after falling.  The fall was unwitnessed but she did report some head trauma without loss of consciousness.  She normally ambulates with a walker.  Further history is otherwise limited at this time due to her receiving a benzodiazepine prior to transfer to this unit.    Chart Review:  Patient has not had any recent hospitalizations within the system.    Outpatient Follow-Up  Date of Visit Physician Service   May 2019 Ventura Thompson MD Urogynecology   Apr 2016 Shonna Orellana MD Gynecology   Dec 2015 Rachel Lundy MD Primary Care     Past Medical History:   Diagnosis Date    Cancer     lung    COPD (chronic obstructive pulmonary disease)     Stroke     TIA    Thyroid disease        Past Surgical History:   Procedure Laterality Date    back surgery 2018      BLADDER SUSPENSION      HYSTERECTOMY      TONSILLECTOMY, ADENOIDECTOMY         Review of patient's allergies indicates:   Allergen Reactions    Msg [glutamic acid hcl] Other (See Comments)     SEIZURE    Codeine Other (See Comments)     Unknown reaction    Penicillins Other (See Comments)     Unknown reaction    Morphine        No current facility-administered medications on file prior to encounter.       Current Outpatient Medications on File Prior to Encounter   Medication Sig    liothyronine (CYTOMEL) 5 MCG Tab     mirtazapine (REMERON) 15 MG tablet Take 7.5 mg by mouth every evening.    promethazine-codeine 6.25-10 mg/5 ml (PHENERGAN WITH CODEINE) 6.25-10 mg/5 mL syrup Take 5 mLs by mouth every 4 (four) hours as needed for Cough.    benzonatate (TESSALON) 100 MG capsule     mirabegron 50 mg Tb24 Take 1 tablet (50 mg total) by mouth once daily.    multivitamin with minerals tablet Take by mouth Daily. 1 Tablet Oral Every day    SYNTHROID 25 mcg tablet      Family History     Problem Relation (Age of Onset)    Breast cancer Sister (69)        Tobacco Use    Smoking status: Former Smoker    Smokeless tobacco: Never Used   Substance and Sexual Activity    Alcohol use: Yes     Alcohol/week: 0.0 oz     Comment: occ    Drug use: No    Sexual activity: Not Currently     Birth control/protection: Surgical     Review of Systems   Unable to perform ROS: Mental status change     Objective:     Vital Signs (Most Recent):  Temp: 98.4 °F (36.9 °C) (07/31/19 0034)  Pulse: 77 (07/30/19 2353)  Resp: 18 (07/30/19 2311)  BP: 114/62 (07/30/19 2353)  SpO2: 95 % (07/30/19 2353) Vital Signs (24h Range):  Temp:  [97.8 °F (36.6 °C)-98.5 °F (36.9 °C)] 98.4 °F (36.9 °C)  Pulse:  [77-99] 77  Resp:  [17-18] 18  SpO2:  [95 %-99 %] 95 %  BP: (112-147)/(56-81) 114/62     Weight: 46.4 kg (102 lb 4.7 oz)  Body mass index is 20.66 kg/m².    Physical Exam   Constitutional: She appears well-developed and well-nourished. No distress.   HENT:   Head: Normocephalic and atraumatic.   Right Ear: External ear normal.   Left Ear: External ear normal.   Nose: Nose normal.   Eyes: Right eye exhibits no discharge. Left eye exhibits no discharge.   Cardiovascular: Normal rate, regular rhythm, normal heart sounds and intact distal pulses. Exam reveals no gallop and no friction rub.   No murmur heard.  Pulmonary/Chest: Effort normal and breath  sounds normal. No stridor. No respiratory distress. She has no wheezes. She has no rales. She exhibits no tenderness.   Abdominal: Soft. Bowel sounds are normal. She exhibits no distension. There is no tenderness. There is no rebound and no guarding.   Musculoskeletal:   Left lower extremity is shortened but internally rotated.  She has a neurologically intact left lower extremity.   Neurological:   Patient is somnolent but responds to pain and is protecting her airway   Skin: Skin is warm and dry. She is not diaphoretic. No erythema.   Nursing note and vitals reviewed.          Significant Labs: All pertinent labs within the past 24 hours have been reviewed.    Significant Imaging: I have reviewed and interpreted all pertinent imaging results/findings within the past 24 hours.    Assessment/Plan:     * Closed comminuted intertrochanteric fracture of left femur  Patient has sustain a unwitnessed fall at the nursing home today.  She underwent CT-head and CT-cervical spine and they were both negative for acute abnormalities.  She also underwent left knee pain radiograph which was negative but her left hip plain radiograph was significant for ...a comminuted intertrochanteric fracture of the left proximal femur with associated superior displacement and mild impaction.  Orthopedic Surgery, Dr. Monik Barker, was consulted and recommended operative repair.  Her Revised Cardiac Risk Index suggests that she is at very low risk for perioperative cardiac events.  Will provide supportive care and await further recommendations.    Hypokalemia  Will replete orally and recheck her potassium level in the morning.    Chronic obstructive pulmonary disease  Clinically-stable without wheezing.  Will provide as-needed BING/LAMA available.    Chronic respiratory failure with hypoxia  Will continue home supplemental oxygen therapy.    Acquired hypothyroidism  Poorly controlled; will continue her home regimen of levothyroxine and  liothyronine and deferred dosage adjustments to her PCP.    Anemia of chronic disease  Unfortunately, we do not have previous lab work to which to compare to chronicity of her anemia, but she does not have any overt signs of bleeding; there is no indications for transfusion at this time.  Will continue to monitor.    History of lung cancer  Stable; there are no acute issues.    History of transient ischemic attack (TIA)  Stable; there are no acute issues.    VTE Risk Mitigation (From admission, onward)        Ordered     enoxaparin injection 40 mg  Daily      07/31/19 0044     IP VTE HIGH RISK PATIENT  Once      07/31/19 0044             Carolina Ann M.D.  Staff [facility]  Department of Hospital Medicine  [facility]  Pager: [phone]          N.B.: Portions of this note was dictated using M*Modal Fluency Direct--there may be voice recognition errors occasionally missed on review.

## 2019-07-31 NOTE — ED TRIAGE NOTES
Pt here with c/o left hip pain after trip and fall at nursing home, unwitnessed. Pt denies hitting her head, denies LOC.

## 2019-07-31 NOTE — PLAN OF CARE
07/31/19 1235   Discharge Assessment   Assessment Type Discharge Planning Assessment   TN attempted to complete assessment. Pt is in surgery.

## 2019-07-31 NOTE — SUBJECTIVE & OBJECTIVE
Past Medical History:   Diagnosis Date    Cancer     lung    COPD (chronic obstructive pulmonary disease)     Stroke     TIA    Thyroid disease        Past Surgical History:   Procedure Laterality Date    back surgery 2018      BLADDER SUSPENSION      HYSTERECTOMY      TONSILLECTOMY, ADENOIDECTOMY         Review of patient's allergies indicates:   Allergen Reactions    Msg [glutamic acid hcl] Other (See Comments)     SEIZURE    Codeine Other (See Comments)     Unknown reaction    Penicillins Other (See Comments)     Unknown reaction    Morphine        No current facility-administered medications on file prior to encounter.      Current Outpatient Medications on File Prior to Encounter   Medication Sig    liothyronine (CYTOMEL) 5 MCG Tab     mirtazapine (REMERON) 15 MG tablet Take 7.5 mg by mouth every evening.    promethazine-codeine 6.25-10 mg/5 ml (PHENERGAN WITH CODEINE) 6.25-10 mg/5 mL syrup Take 5 mLs by mouth every 4 (four) hours as needed for Cough.    benzonatate (TESSALON) 100 MG capsule     mirabegron 50 mg Tb24 Take 1 tablet (50 mg total) by mouth once daily.    multivitamin with minerals tablet Take by mouth Daily. 1 Tablet Oral Every day    SYNTHROID 25 mcg tablet      Family History     Problem Relation (Age of Onset)    Breast cancer Sister (69)        Tobacco Use    Smoking status: Former Smoker    Smokeless tobacco: Never Used   Substance and Sexual Activity    Alcohol use: Yes     Alcohol/week: 0.0 oz     Comment: occ    Drug use: No    Sexual activity: Not Currently     Birth control/protection: Surgical     Review of Systems   Unable to perform ROS: Mental status change     Objective:     Vital Signs (Most Recent):  Temp: 98.4 °F (36.9 °C) (07/31/19 0034)  Pulse: 77 (07/30/19 2353)  Resp: 18 (07/30/19 2311)  BP: 114/62 (07/30/19 2353)  SpO2: 95 % (07/30/19 2353) Vital Signs (24h Range):  Temp:  [97.8 °F (36.6 °C)-98.5 °F (36.9 °C)] 98.4 °F (36.9 °C)  Pulse:  [77-99]  77  Resp:  [17-18] 18  SpO2:  [95 %-99 %] 95 %  BP: (112-147)/(56-81) 114/62     Weight: 46.4 kg (102 lb 4.7 oz)  Body mass index is 20.66 kg/m².    Physical Exam   Constitutional: She appears well-developed and well-nourished. No distress.   HENT:   Head: Normocephalic and atraumatic.   Right Ear: External ear normal.   Left Ear: External ear normal.   Nose: Nose normal.   Eyes: Right eye exhibits no discharge. Left eye exhibits no discharge.   Cardiovascular: Normal rate, regular rhythm, normal heart sounds and intact distal pulses. Exam reveals no gallop and no friction rub.   No murmur heard.  Pulmonary/Chest: Effort normal and breath sounds normal. No stridor. No respiratory distress. She has no wheezes. She has no rales. She exhibits no tenderness.   Abdominal: Soft. Bowel sounds are normal. She exhibits no distension. There is no tenderness. There is no rebound and no guarding.   Musculoskeletal:   Left lower extremity is shortened but internally rotated.  She has a neurologically intact left lower extremity.   Neurological:   Patient is somnolent but responds to pain and is protecting her airway   Skin: Skin is warm and dry. She is not diaphoretic. No erythema.   Nursing note and vitals reviewed.          Significant Labs: All pertinent labs within the past 24 hours have been reviewed.    Significant Imaging: I have reviewed and interpreted all pertinent imaging results/findings within the past 24 hours.

## 2019-07-31 NOTE — PT/OT/SLP PROGRESS
Speech Language Pathology      Lena Driscoll  MRN: [id number]    Patient not seen today secondary to Other (Comment)(Pt off the floor for surgery.). Will follow-up tomorrow 8/1/19.    Sol Mcadams, TURNER-SLP

## 2019-07-31 NOTE — NURSING
Surgical consent signed and placed in chart. Patient transported via bed to PACU at Dr. Paul's request. No acute distress noted.

## 2019-07-31 NOTE — CARE UPDATE
Patient has been seen and examinated,patient has been  admitted for left femoral fracture,after mechanical fall,Ortho. Has been consulted for surgical repaire,patient has history of lung cancer with palliative radiation 3 years ago,,she has small right side pleural effusion,likely malignancy,she is stable on 2 liter Nc O 2,she has chronic respiratory failure,COPD,that will put her at lesat at moderate risk for surgical procedure,her respiratory status on baseline at this time,spoke with  and son in details,they agree proceed with intervention,they understand associated risk.started on duoneb,will has ST eval. before feeding.

## 2019-07-31 NOTE — NURSING
Patient arrived to floor via stretcher. Alert and oriented, easily falls asleep due to medication given in ER. Oriented patient and family to room and call light. IVF infusing. SCDS applied. Reviewed orders and plan of care.

## 2019-07-31 NOTE — ASSESSMENT & PLAN NOTE
Unfortunately, we do not have previous lab work to which to compare to chronicity of her anemia, but she does not have any overt signs of bleeding; there is no indications for transfusion at this time.  Will continue to monitor.

## 2019-07-31 NOTE — PLAN OF CARE
Problem: Adult Inpatient Plan of Care  Goal: Plan of Care Review  Outcome: Ongoing (interventions implemented as appropriate)     07/31/19 9831   Plan of Care Review   Plan of Care Reviewed With patient;daughter;son;spouse     Patient remained free from falls, trauma, and injuries throughout shift. No complaints of nausea or vomiting. Pain controlled with prn analgesics. IV fluids, IV antibiotics, and medications administered as prescribed. L hip ORIF performed today. Dressing to L hip clean, dry, and intact. Vitale in place. Family remains at bedside; very supportibe in care. Turned q2 to prevent breakdown. No acute distress noted.

## 2019-07-31 NOTE — CONSULTS
Ortho Consult    Chief Complaint   Patient presents with    Hip Pain     left hip pain after fall unwitnessed from [place], shortening and roatation noted       History of present illness:    Patient is an 84-year-old female with a history of chronic pulmonary disease of the with COPD and previous lung cancer treated with radiation.  She has chronic pulmonary effusions.  She is ambulatory with a rolling walker and sustained a fall yesterday onto her left hip.  She suffered a comminuted intratrochanteric fracture of her left hip.  She has been admitted now is consult for assistance in management with her left hip.    Past Medical History:   Diagnosis Date    Cancer     lung    COPD (chronic obstructive pulmonary disease)     Stroke     TIA    Thyroid disease        Past Surgical History:   Procedure Laterality Date    back surgery 2018      BLADDER SUSPENSION      HYSTERECTOMY      TONSILLECTOMY, ADENOIDECTOMY         Review of patient's allergies indicates:   Allergen Reactions    Msg [glutamic acid hcl] Other (See Comments)     SEIZURE    Codeine Other (See Comments)     Unknown reaction    Penicillins Other (See Comments)     Unknown reaction    Morphine        Prior to Admission medications    Medication Sig Start Date End Date Taking? Authorizing Provider   liothyronine (CYTOMEL) 5 MCG Tab  12/31/18  Yes Historical Provider, MD   mirtazapine (REMERON) 15 MG tablet Take 7.5 mg by mouth every evening.   Yes Historical Provider, MD   promethazine-codeine 6.25-10 mg/5 ml (PHENERGAN WITH CODEINE) 6.25-10 mg/5 mL syrup Take 5 mLs by mouth every 4 (four) hours as needed for Cough.   Yes Historical Provider, MD   benzonatate (TESSALON) 100 MG capsule  7/19/17   Historical Provider, MD   mirabegron 50 mg Tb24 Take 1 tablet (50 mg total) by mouth once daily. 4/19/17 4/19/18  Ventura Thompson MD   multivitamin with minerals tablet Take by mouth Daily. 1 Tablet Oral Every day    Historical Provider, MD  "  SYNTHROID 25 mcg tablet  4/14/17   Historical Provider, MD       Social History     Occupational History    Not on file   Tobacco Use    Smoking status: Former Smoker    Smokeless tobacco: Never Used   Substance and Sexual Activity    Alcohol use: Yes     Alcohol/week: 0.0 oz     Comment: occ    Drug use: No    Sexual activity: Not Currently     Birth control/protection: Surgical       Temp:  [97.5 °F (36.4 °C)-98.5 °F (36.9 °C)] 97.5 °F (36.4 °C)  Pulse:  [77-99] 95  Resp:  [17-20] 18  SpO2:  [95 %-99 %] 95 %  BP: (112-147)/(56-81) 119/70  Height: 4' 11" (149.9 cm)  Weight: 46.4 kg (102 lb 4.7 oz)  BMI (Calculated): 20.7    Physical examination:    Somewhat female in bed.  She has nasal cannula oxygen 2 L in place. She seems to be breathing without distress. She answers questions appropriately and denies pain anywhere except for the left hip.  Head neck are nontender  Chest is nontender  Abdomen soft nontender  Pelvis is stable and nontender  Both arms are nontender to palpation and range of motion  Right leg is nontender to palpation  Left hip is tender and swollen.  She has tenderness about the lateral aspect and anteriorly in the groin.  She has pain with range of motion of the left hip. She is nontender about the distal thigh knee leg and foot.  She is able flex extend her toes on both sides.  Sensible to light touch is present.      Recent Labs   Lab 07/31/19  0537   WBC 12.02   RBC 3.71*   HGB 10.5*   HCT 33.1*      MCV 89   MCH 28.3   MCHC 31.7*     Recent Labs   Lab 07/31/19  0537   CALCIUM 8.7   PROT 5.8*   *   K 4.4   CO2 29      BUN 8   CREATININE 0.6   ALKPHOS 55   ALT 11   AST 12   BILITOT 0.6     Recent Labs   Lab 07/30/19  1949   INR 1.0   APTT 28.3       Imaging Results          CT Head Without Contrast (Final result)  Result time 07/30/19 20:23:47    Final result by Neyda Dash MD (07/30/19 20:23:47)                 Impression:      No acute intracranial " abnormalities identified.      Electronically signed by: Neyda Dash MD  Date:    07/30/2019  Time:    20:23             Narrative:    EXAMINATION:  CT HEAD WITHOUT CONTRAST    CLINICAL HISTORY:  Head trauma, minor, GCS>=13, NOC/NEXUS/CCR positive, first study;    TECHNIQUE:  Low dose axial images were obtained through the head.  Coronal and sagittal reformations were also performed. Contrast was not administered.    COMPARISON:  None.    FINDINGS:  There is generalized cerebral volume loss with chronic microvascular ischemic disease.  No evidence of acute/recent major vascular distribution cerebral infarction, intraparenchymal hemorrhage, or intra-axial space occupying lesion. The ventricular system is normal in size and configuration with no evidence of hydrocephalus. No effacement of the skull-base cisterns. No abnormal extra-axial fluid collections or blood products. Visualized paranasal sinuses and mastoid air cells are clear. The calvarium shows no significant abnormality.                               CT Pelvis Without Contrast (Final result)  Result time 07/30/19 20:29:39    Final result by Neyda Dash MD (07/30/19 20:29:39)                 Impression:      Acute comminuted intertrochanteric left proximal femur fracture.      Electronically signed by: Neyda Dash MD  Date:    07/30/2019  Time:    20:29             Narrative:    EXAMINATION:  CT PELVIS WITHOUT CONTRAST    CLINICAL HISTORY:  Pelvis trauma, fx known or suspected, xray insufficient;    TECHNIQUE:  CT of the pelvis was performed without use of IV contrast.    COMPARISON:  Left hip radiographs from the same date.    FINDINGS:  Redemonstration of acute comminuted intertrochanteric left femoral neck fracture.  Suspected hematoma seen about the left hip fracture site.  Osteopenic changes are visualized.  No additional acute pelvic fractures or dislocation seen.  Degenerative changes are visualized within the lower lumbar spine.   Degenerative changes are also seen at the pubic symphysis.    Partially visualized lower abdominal and intrapelvic contents show no acute abnormalities.  Pessary device is noted within the pelvis.                               CT Cervical Spine Without Contrast (Final result)  Result time 07/30/19 20:26:26    Final result by Neyda Dash MD (07/30/19 20:26:26)                 Impression:      No evidence of acute cervical spine fracture or dislocation.      Electronically signed by: Neyda Dash MD  Date:    07/30/2019  Time:    20:26             Narrative:    EXAMINATION:  CT CERVICAL SPINE WITHOUT CONTRAST    CLINICAL HISTORY:  C-spine trauma, NEXUS/CCR positive, +risk factor(s);    TECHNIQUE:  Low dose axial images, sagittal and coronal reformations were performed though the cervical spine.  Contrast was not administered.    COMPARISON:  None    FINDINGS:  No evidence of acute cervical spine fracture or dislocation.  Odontoid process is intact.  Craniocervical junction is unremarkable.  Cervical spine alignment is within normal limits.  Mild multilevel degenerative changes are seen in the cervical spine.    Surrounding soft tissues show no significant abnormalities.  Airway is patent.  Mild emphysematous changes are seen within the lung apices..                               X-Ray Chest AP Portable (Final result)  Result time 07/30/19 19:41:22    Final result by Alfredo Velazco MD (07/30/19 19:41:22)                 Impression:      1. Small right dependent pleural effusion.  2. Scattered reticular opacities within the middle and lower lung zones, possibly reflecting edema versus an infectious or non infectious inflammatory process.  No pneumothorax.      Electronically signed by: Alfredo Velazco MD  Date:    07/30/2019  Time:    19:41             Narrative:    EXAMINATION:  XR CHEST AP PORTABLE    CLINICAL HISTORY:  Unspecified fall, initial encounter    TECHNIQUE:  Single frontal view of the chest was  performed.    COMPARISON:  Radiograph 07/24/2017.    FINDINGS:  Mediastinal structures are midline.  There is atherosclerotic calcification of the aorta.  Hilar contours are unremarkable.  Cardiac silhouette is enlarged.  Lung volumes are symmetric.  There is a small right pleural effusion.  Scattered reticular opacities noted within the middle and lower lung zones, possibly reflecting edema.  No pneumothorax.  No free air beneath the diaphragm.  There are suspected vertebroplasty changes involving a midthoracic vertebra.  No acute osseous abnormalities.                               X-Ray Hip 2 View Left (Final result)  Result time 07/30/19 19:37:34    Final result by Alfredo Velazco MD (07/30/19 19:37:34)                 Impression:      1. Comminuted intertrochanteric fracture of the left proximal femur with associated superior displacement and mild impaction.  2. Generalized osteopenia.      Electronically signed by: Alfredo Velazco MD  Date:    07/30/2019  Time:    19:37             Narrative:    EXAMINATION:  XR HIP 2 VIEW LEFT    CLINICAL HISTORY:  Unspecified fall, initial encounter    TECHNIQUE:  AP view of the pelvis and AP view of the left hip were performed.    COMPARISON:  None.    FINDINGS:  There is a comminuted intertrochanteric fracture of the left proximal femur with associated superior displacement and mild impaction.  Femoroacetabular joint is congruent.  No subluxation or dislocation.  There is generalized osteopenia.  No suspicious lytic or blastic lesions.  Degenerative changes of the spine and SI joints noted.                               X-Ray Knee 1 or 2 View Left (Final result)  Result time 07/30/19 19:35:17    Final result by Alfredo Velazco MD (07/30/19 19:35:17)                 Impression:      1. No acute displaced fractures.  2. Tibiofemoral cartilage space narrowing.  3. Generalized osteopenia.      Electronically signed by: Alfredo Velazco  MD  Date:    07/30/2019  Time:    19:35             Narrative:    EXAMINATION:  XR KNEE 1 OR 2 VIEW LEFT    CLINICAL HISTORY:  Unspecified fall, initial encounter    TECHNIQUE:  AP and cross-table lateral views of the left knee.    COMPARISON:  None.    FINDINGS:  There is generalized osteopenia.  No acute displaced fractures.  There is tibiofemoral cartilage space narrowing.  No subluxation or dislocation.  No suspicious lytic or blastic lesions.  No joint effusions.  Scattered vascular calcifications are identified.  No radiopaque foreign bodies.                                Assessment and Plan:    84-year-old female status post fall with left hip intertrochanteric femur fracture.    COPD history of lung cancer radiation chronic pulmonary effusion. On home oxygen.    I had a long discussion with and her family on options of treatment including conservative treatment with pain relief and medications at bedrest or taking her to surgery for open reduction internal fixation with a intramedullary device for left hip intertrochanteric fracture. After going over the risks and benefits in detail with him and answered the questions they decided to go forth with surgery for her.  I think that is best given her previously functional status.  I spoken with her primary physician Dr. Monet and Dr. Mcduffie who both agree that surgery is the best option for her.  We will set her up for surgery. Hopefully she can be done under spinal anesthetic as she is not on anticoagulants.    Her  to sign consents for surgery and the questions were answered.  No guarantees were made.  They are aware that she is at increased risk because of her previous pulmonary history.

## 2019-07-31 NOTE — ED PROVIDER NOTES
Encounter Date: 7/30/2019    SCRIBE #1 NOTE: I, Herminiavelma Yan, am scribing for, and in the presence of, Laurel Mccauley MD.       History     Chief Complaint   Patient presents with    Hip Pain     left hip pain after fall unwitnessed from [facility], shortening and roatation noted     84-year-old female with PMHx of COPD on chronic oxygen therapy, lung cancer and thyroid disease presents to ED from [facility] with complaints of left hip pain status post unwitnessed mechanical trip and fall.  Patient reports head trauma but denies LOC. Patient reports SOB consistent with her usual COPD. She reports using 3L of O2 daily. Patient denies any preceding chest pain, palpitations, dizziness, lightheadedness.  She denies current headache, neck pain, or abdominal pain. Her son notes she ambulates with walker and has Hx of falling and/or tripping. Allergy to penicillin, codeine, and Msg noted. She reports Hx of adverse reaction to pain medications.         Review of patient's allergies indicates:   Allergen Reactions    Msg [glutamic acid hcl] Other (See Comments)     SEIZURE    Codeine Other (See Comments)     Unknown reaction    Penicillins Other (See Comments)     Unknown reaction    Morphine      Past Medical History:   Diagnosis Date    Cancer     lung    COPD (chronic obstructive pulmonary disease)     Stroke     TIA    Thyroid disease      Past Surgical History:   Procedure Laterality Date    back surgery 2018      BLADDER SUSPENSION      HYSTERECTOMY      TONSILLECTOMY, ADENOIDECTOMY       Family History   Problem Relation Age of Onset    Breast cancer Sister 69    Ovarian cancer Neg Hx     Colon cancer Neg Hx     Diabetes Neg Hx     Hypertension Neg Hx     Stroke Neg Hx     Vaginal cancer Neg Hx     Endometrial cancer Neg Hx     Cervical cancer Neg Hx      Social History     Tobacco Use    Smoking status: Former Smoker    Smokeless tobacco: Never Used   Substance Use Topics    Alcohol use:  Yes     Alcohol/week: 0.0 oz     Comment: occ    Drug use: No     Review of Systems   Constitutional: Negative for fever.   HENT: Negative for congestion.    Eyes: Negative for discharge.   Respiratory: Positive for shortness of breath (chronic).    Cardiovascular: Negative for chest pain and palpitations.   Gastrointestinal: Negative for abdominal pain.   Musculoskeletal: Negative for back pain.        (+) left hip pain   Skin: Negative for rash.   Neurological: Negative for dizziness, syncope and light-headedness.   Psychiatric/Behavioral: Negative for confusion.       Physical Exam     Initial Vitals [07/30/19 1842]   BP Pulse Resp Temp SpO2   (!) 147/81 99 18 97.8 °F (36.6 °C) 97 %      MAP       --         Physical Exam    Nursing note and vitals reviewed.  Constitutional: She is not diaphoretic. No distress.   HENT:   Head: Normocephalic and atraumatic.   Mouth/Throat: Oropharynx is clear and moist.   Eyes: EOM are normal. Pupils are equal, round, and reactive to light. No scleral icterus.   Neck: Normal range of motion. Neck supple. No JVD present.   Cardiovascular: Normal rate, regular rhythm and intact distal pulses.   Pulmonary/Chest: No stridor. No respiratory distress. She exhibits no tenderness.   Abdominal: Soft. Bowel sounds are normal. She exhibits no distension. There is no tenderness.   Musculoskeletal: She exhibits no edema.        Left hip: She exhibits tenderness and bony tenderness.        Cervical back: She exhibits no bony tenderness.   Left hip tenderness and left lower extremity shortening noted.  Distal pulses intact   Neurological: She is alert and oriented to person, place, and time. She has normal strength. No cranial nerve deficit or sensory deficit. GCS score is 15. GCS eye subscore is 4. GCS verbal subscore is 5. GCS motor subscore is 6.   Skin: Skin is warm and dry.   Psychiatric: She has a normal mood and affect.         ED Course   Procedures  Labs Reviewed   COMPREHENSIVE  METABOLIC PANEL - Abnormal; Notable for the following components:       Result Value    Potassium 3.1 (*)     Glucose 123 (*)     Albumin 3.0 (*)     ALT 8 (*)     All other components within normal limits   URINALYSIS, REFLEX TO URINE CULTURE - Abnormal; Notable for the following components:    Ketones, UA 1+ (*)     Occult Blood UA 1+ (*)     All other components within normal limits    Narrative:     Preferred Collection Type->Urine, Catheterized   CBC W/ AUTO DIFFERENTIAL - Abnormal; Notable for the following components:    RBC 3.99 (*)     Hemoglobin 11.6 (*)     Hematocrit 35.1 (*)     RDW 14.9 (*)     Gran # (ANC) 9.2 (*)     Lymph # 0.8 (*)     Gran% 85.1 (*)     Lymph% 7.6 (*)     All other components within normal limits   URINALYSIS MICROSCOPIC - Abnormal; Notable for the following components:    Granular Casts, UA 2 (*)     All other components within normal limits    Narrative:     Preferred Collection Type->Urine, Catheterized   PROTIME-INR   APTT   TYPE & SCREEN          Imaging Results          CT Head Without Contrast (Final result)  Result time 07/30/19 20:23:47    Final result by Neyda Dash MD (07/30/19 20:23:47)                 Impression:      No acute intracranial abnormalities identified.      Electronically signed by: Neyda Dash MD  Date:    07/30/2019  Time:    20:23             Narrative:    EXAMINATION:  CT HEAD WITHOUT CONTRAST    CLINICAL HISTORY:  Head trauma, minor, GCS>=13, NOC/NEXUS/CCR positive, first study;    TECHNIQUE:  Low dose axial images were obtained through the head.  Coronal and sagittal reformations were also performed. Contrast was not administered.    COMPARISON:  None.    FINDINGS:  There is generalized cerebral volume loss with chronic microvascular ischemic disease.  No evidence of acute/recent major vascular distribution cerebral infarction, intraparenchymal hemorrhage, or intra-axial space occupying lesion. The ventricular system is normal in size and  configuration with no evidence of hydrocephalus. No effacement of the skull-base cisterns. No abnormal extra-axial fluid collections or blood products. Visualized paranasal sinuses and mastoid air cells are clear. The calvarium shows no significant abnormality.                               CT Pelvis Without Contrast (Final result)  Result time 07/30/19 20:29:39    Final result by Neyda Dash MD (07/30/19 20:29:39)                 Impression:      Acute comminuted intertrochanteric left proximal femur fracture.      Electronically signed by: Neyda Dash MD  Date:    07/30/2019  Time:    20:29             Narrative:    EXAMINATION:  CT PELVIS WITHOUT CONTRAST    CLINICAL HISTORY:  Pelvis trauma, fx known or suspected, xray insufficient;    TECHNIQUE:  CT of the pelvis was performed without use of IV contrast.    COMPARISON:  Left hip radiographs from the same date.    FINDINGS:  Redemonstration of acute comminuted intertrochanteric left femoral neck fracture.  Suspected hematoma seen about the left hip fracture site.  Osteopenic changes are visualized.  No additional acute pelvic fractures or dislocation seen.  Degenerative changes are visualized within the lower lumbar spine.  Degenerative changes are also seen at the pubic symphysis.    Partially visualized lower abdominal and intrapelvic contents show no acute abnormalities.  Pessary device is noted within the pelvis.                               CT Cervical Spine Without Contrast (Final result)  Result time 07/30/19 20:26:26    Final result by Neyda Dash MD (07/30/19 20:26:26)                 Impression:      No evidence of acute cervical spine fracture or dislocation.      Electronically signed by: Neyda Dash MD  Date:    07/30/2019  Time:    20:26             Narrative:    EXAMINATION:  CT CERVICAL SPINE WITHOUT CONTRAST    CLINICAL HISTORY:  C-spine trauma, NEXUS/CCR positive, +risk factor(s);    TECHNIQUE:  Low dose axial images, sagittal  and coronal reformations were performed though the cervical spine.  Contrast was not administered.    COMPARISON:  None    FINDINGS:  No evidence of acute cervical spine fracture or dislocation.  Odontoid process is intact.  Craniocervical junction is unremarkable.  Cervical spine alignment is within normal limits.  Mild multilevel degenerative changes are seen in the cervical spine.    Surrounding soft tissues show no significant abnormalities.  Airway is patent.  Mild emphysematous changes are seen within the lung apices..                               X-Ray Chest AP Portable (Final result)  Result time 07/30/19 19:41:22    Final result by Alfredo Velazco MD (07/30/19 19:41:22)                 Impression:      1. Small right dependent pleural effusion.  2. Scattered reticular opacities within the middle and lower lung zones, possibly reflecting edema versus an infectious or non infectious inflammatory process.  No pneumothorax.      Electronically signed by: Alfredo Velazco MD  Date:    07/30/2019  Time:    19:41             Narrative:    EXAMINATION:  XR CHEST AP PORTABLE    CLINICAL HISTORY:  Unspecified fall, initial encounter    TECHNIQUE:  Single frontal view of the chest was performed.    COMPARISON:  Radiograph 07/24/2017.    FINDINGS:  Mediastinal structures are midline.  There is atherosclerotic calcification of the aorta.  Hilar contours are unremarkable.  Cardiac silhouette is enlarged.  Lung volumes are symmetric.  There is a small right pleural effusion.  Scattered reticular opacities noted within the middle and lower lung zones, possibly reflecting edema.  No pneumothorax.  No free air beneath the diaphragm.  There are suspected vertebroplasty changes involving a midthoracic vertebra.  No acute osseous abnormalities.                               X-Ray Hip 2 View Left (Final result)  Result time 07/30/19 19:37:34    Final result by Alfredo Velazco MD (07/30/19 19:37:34)                  Impression:      1. Comminuted intertrochanteric fracture of the left proximal femur with associated superior displacement and mild impaction.  2. Generalized osteopenia.      Electronically signed by: Alfredo Velazco MD  Date:    07/30/2019  Time:    19:37             Narrative:    EXAMINATION:  XR HIP 2 VIEW LEFT    CLINICAL HISTORY:  Unspecified fall, initial encounter    TECHNIQUE:  AP view of the pelvis and AP view of the left hip were performed.    COMPARISON:  None.    FINDINGS:  There is a comminuted intertrochanteric fracture of the left proximal femur with associated superior displacement and mild impaction.  Femoroacetabular joint is congruent.  No subluxation or dislocation.  There is generalized osteopenia.  No suspicious lytic or blastic lesions.  Degenerative changes of the spine and SI joints noted.                               X-Ray Knee 1 or 2 View Left (Final result)  Result time 07/30/19 19:35:17    Final result by Alfredo Velazco MD (07/30/19 19:35:17)                 Impression:      1. No acute displaced fractures.  2. Tibiofemoral cartilage space narrowing.  3. Generalized osteopenia.      Electronically signed by: Alfredo Velazco MD  Date:    07/30/2019  Time:    19:35             Narrative:    EXAMINATION:  XR KNEE 1 OR 2 VIEW LEFT    CLINICAL HISTORY:  Unspecified fall, initial encounter    TECHNIQUE:  AP and cross-table lateral views of the left knee.    COMPARISON:  None.    FINDINGS:  There is generalized osteopenia.  No acute displaced fractures.  There is tibiofemoral cartilage space narrowing.  No subluxation or dislocation.  No suspicious lytic or blastic lesions.  No joint effusions.  Scattered vascular calcifications are identified.  No radiopaque foreign bodies.                              X-Rays:   Independently Interpreted Readings:   Chest X-Ray: Right pleural effusion present.     Medical Decision Making:   History:   Old Medical Records: I decided to obtain old medical  records.  Old Records Summarized: records from clinic visits.       <> Summary of Records: Follows with pulmonology for bronchogenic carcinoma and COPD  Differential Diagnosis:   Differential includes but is not limited to hip fracture, hip dislocation, contusion, intracranial injury, or c-spine injury.   Independently Interpreted Test(s):   I have ordered and independently interpreted X-rays - see prior notes.  Clinical Tests:   Lab Tests: Ordered and Reviewed  Radiological Study: Ordered and Reviewed  ED Management:  Patient is afebrile and in no acute distress at time history and physical.  She has left lower extremity shortening and tenderness to the left hip.  Her distal pulses are intact. X-rays noted for comminuted left intertrochanteric hip fracture.  CT of the brain and cervical spine negative for acute traumatic injury. CT of the pelvis without pelvic fracture.  Patient denies symptoms preceding her fall and believes that it was mechanical nature.  Family reports that patient uses a walker to ambulate and has a history of falls.  Labs without leukocytosis.  Patient has mild hypokalemia which was repleted in the emergency department.    Consult: I have spoken with Dr. Paul from the orthopedics service regarding the patient's presentation and study results.  At this time, the recommendation is admission to Hospital Medicine, NPO past midnight possible ORIF in the morning.    Patient is to be admitted to Hospital Medicine for further management of left hip fracture  This chart was completed using dictation software, as a result there may be some transcription errors.             Scribe Attestation:   Scribe #1: I performed the above scribed service and the documentation accurately describes the services I performed. I attest to the accuracy of the note.               Clinical Impression:       ICD-10-CM ICD-9-CM   1. Fall W19.XXXA E888.9   2. Fall W19.XXXA E888.9   3. Closed left hip fracture, initial  encounter S72.002A 820.8         Disposition:   Disposition: Admitted  Condition: Stable       Scribe attestation: I, Laurel Mccauley , personally performed the services described in this documentation. All medical record entries made by the scribe were at my direction and in my presence.  I have reviewed the chart and agree that the record reflects my personal performance and is accurate and complete.                   Laurel Mccauley MD  07/31/19 0217

## 2019-07-31 NOTE — ASSESSMENT & PLAN NOTE
Patient has sustain a unwitnessed fall at the nursing home today.  She underwent CT-head and CT-cervical spine and they were both negative for acute abnormalities.  She also underwent left knee pain radiograph which was negative but her left hip plain radiograph was significant for ...a comminuted intertrochanteric fracture of the left proximal femur with associated superior displacement and mild impaction.  Orthopedic Surgery, Dr. Monik Barker, was consulted and recommended operative repair.  Her Revised Cardiac Risk Index suggests that she is at very low risk for perioperative cardiac events.  Will provide supportive care and await further recommendations.

## 2019-08-01 LAB
ALBUMIN SERPL BCP-MCNC: 2.4 G/DL (ref 3.5–5.2)
ALP SERPL-CCNC: 50 U/L (ref 55–135)
ALT SERPL W/O P-5'-P-CCNC: 8 U/L (ref 10–44)
ANION GAP SERPL CALC-SCNC: 8 MMOL/L (ref 8–16)
AORTIC ROOT ANNULUS: 3.07 CM
AORTIC VALVE CUSP SEPERATION: 1.55 CM
AST SERPL-CCNC: 12 U/L (ref 10–40)
AV INDEX (PROSTH): 0.79
AV MEAN GRADIENT: 6 MMHG
AV PEAK GRADIENT: 9 MMHG
AV VALVE AREA: 3.15 CM2
AV VELOCITY RATIO: 0.84
BASOPHILS # BLD AUTO: 0.02 K/UL (ref 0–0.2)
BASOPHILS NFR BLD: 0.2 % (ref 0–1.9)
BILIRUB SERPL-MCNC: 0.5 MG/DL (ref 0.1–1)
BSA FOR ECHO PROCEDURE: 1.39 M2
BUN SERPL-MCNC: 8 MG/DL (ref 8–23)
CALCIUM SERPL-MCNC: 8.2 MG/DL (ref 8.7–10.5)
CHLORIDE SERPL-SCNC: 98 MMOL/L (ref 95–110)
CO2 SERPL-SCNC: 25 MMOL/L (ref 23–29)
CREAT SERPL-MCNC: 0.6 MG/DL (ref 0.5–1.4)
CV ECHO LV RWT: 0.54 CM
DIFFERENTIAL METHOD: ABNORMAL
DOP CALC AO PEAK VEL: 1.54 M/S
DOP CALC AO VTI: 28.94 CM
DOP CALC LVOT AREA: 4 CM2
DOP CALC LVOT DIAMETER: 2.26 CM
DOP CALC LVOT PEAK VEL: 1.29 M/S
DOP CALC LVOT STROKE VOLUME: 91.3 CM3
DOP CALCLVOT PEAK VEL VTI: 22.77 CM
E WAVE DECELERATION TIME: 199.91 MSEC
E/A RATIO: 0.83
ECHO LV POSTERIOR WALL: 0.94 CM (ref 0.6–1.1)
EOSINOPHIL # BLD AUTO: 0.1 K/UL (ref 0–0.5)
EOSINOPHIL NFR BLD: 0.9 % (ref 0–8)
ERYTHROCYTE [DISTWIDTH] IN BLOOD BY AUTOMATED COUNT: 15.3 % (ref 11.5–14.5)
EST. GFR  (AFRICAN AMERICAN): >60 ML/MIN/1.73 M^2
EST. GFR  (NON AFRICAN AMERICAN): >60 ML/MIN/1.73 M^2
FRACTIONAL SHORTENING: 33 % (ref 28–44)
GLUCOSE SERPL-MCNC: 149 MG/DL (ref 70–110)
HCT VFR BLD AUTO: 29.6 % (ref 37–48.5)
HGB BLD-MCNC: 9.2 G/DL (ref 12–16)
INTERVENTRICULAR SEPTUM: 0.74 CM (ref 0.6–1.1)
IVRT: 0.08 MSEC
LA MAJOR: 3.96 CM
LA MINOR: 4.76 CM
LA WIDTH: 3.71 CM
LEFT ATRIUM SIZE: 3.5 CM
LEFT ATRIUM VOLUME INDEX: 34.5 ML/M2
LEFT ATRIUM VOLUME: 47.72 CM3
LEFT INTERNAL DIMENSION IN SYSTOLE: 2.33 CM (ref 2.1–4)
LEFT VENTRICLE DIASTOLIC VOLUME INDEX: 36.52 ML/M2
LEFT VENTRICLE DIASTOLIC VOLUME: 50.59 ML
LEFT VENTRICLE MASS INDEX: 58 G/M2
LEFT VENTRICLE SYSTOLIC VOLUME INDEX: 13.6 ML/M2
LEFT VENTRICLE SYSTOLIC VOLUME: 18.81 ML
LEFT VENTRICULAR INTERNAL DIMENSION IN DIASTOLE: 3.49 CM (ref 3.5–6)
LEFT VENTRICULAR MASS: 80.21 G
LYMPHOCYTES # BLD AUTO: 0.8 K/UL (ref 1–4.8)
LYMPHOCYTES NFR BLD: 6.3 % (ref 18–48)
MCH RBC QN AUTO: 28.1 PG (ref 27–31)
MCHC RBC AUTO-ENTMCNC: 31.1 G/DL (ref 32–36)
MCV RBC AUTO: 91 FL (ref 82–98)
MONOCYTES # BLD AUTO: 1.1 K/UL (ref 0.3–1)
MONOCYTES NFR BLD: 8.9 % (ref 4–15)
MV PEAK A VEL: 1.29 M/S
MV PEAK E VEL: 1.07 M/S
NEUTROPHILS # BLD AUTO: 10.5 K/UL (ref 1.8–7.7)
NEUTROPHILS NFR BLD: 83.7 % (ref 38–73)
PISA TR MAX VEL: 3.5 M/S
PLATELET # BLD AUTO: 241 K/UL (ref 150–350)
PMV BLD AUTO: 9.7 FL (ref 9.2–12.9)
POTASSIUM SERPL-SCNC: 3.5 MMOL/L (ref 3.5–5.1)
PROT SERPL-MCNC: 5.5 G/DL (ref 6–8.4)
PV PEAK VELOCITY: 0.81 CM/S
RA MAJOR: 3.94 CM
RA PRESSURE: 3 MMHG
RA WIDTH: 2.98 CM
RBC # BLD AUTO: 3.27 M/UL (ref 4–5.4)
RIGHT VENTRICULAR END-DIASTOLIC DIMENSION: 2.31 CM
SINUS: 3.14 CM
SODIUM SERPL-SCNC: 131 MMOL/L (ref 136–145)
STJ: 2.73 CM
TR MAX PG: 49 MMHG
TRICUSPID ANNULAR PLANE SYSTOLIC EXCURSION: 2.18 CM
TV REST PULMONARY ARTERY PRESSURE: 52 MMHG
WBC # BLD AUTO: 12.61 K/UL (ref 3.9–12.7)

## 2019-08-01 PROCEDURE — 25000242 PHARM REV CODE 250 ALT 637 W/ HCPCS: Performed by: ORTHOPAEDIC SURGERY

## 2019-08-01 PROCEDURE — 99900035 HC TECH TIME PER 15 MIN (STAT)

## 2019-08-01 PROCEDURE — 25000003 PHARM REV CODE 250: Performed by: INTERNAL MEDICINE

## 2019-08-01 PROCEDURE — 94799 UNLISTED PULMONARY SVC/PX: CPT

## 2019-08-01 PROCEDURE — G8996 SWALLOW CURRENT STATUS: HCPCS | Mod: CI

## 2019-08-01 PROCEDURE — 25000003 PHARM REV CODE 250: Performed by: HOSPITALIST

## 2019-08-01 PROCEDURE — 94761 N-INVAS EAR/PLS OXIMETRY MLT: CPT

## 2019-08-01 PROCEDURE — 63600175 PHARM REV CODE 636 W HCPCS: Performed by: HOSPITALIST

## 2019-08-01 PROCEDURE — 85025 COMPLETE CBC W/AUTO DIFF WBC: CPT

## 2019-08-01 PROCEDURE — 97535 SELF CARE MNGMENT TRAINING: CPT

## 2019-08-01 PROCEDURE — 36415 COLL VENOUS BLD VENIPUNCTURE: CPT

## 2019-08-01 PROCEDURE — 92610 EVALUATE SWALLOWING FUNCTION: CPT

## 2019-08-01 PROCEDURE — 11000001 HC ACUTE MED/SURG PRIVATE ROOM

## 2019-08-01 PROCEDURE — 80053 COMPREHEN METABOLIC PANEL: CPT

## 2019-08-01 PROCEDURE — 25000003 PHARM REV CODE 250: Performed by: ORTHOPAEDIC SURGERY

## 2019-08-01 PROCEDURE — 63600175 PHARM REV CODE 636 W HCPCS: Performed by: ORTHOPAEDIC SURGERY

## 2019-08-01 PROCEDURE — 94640 AIRWAY INHALATION TREATMENT: CPT

## 2019-08-01 PROCEDURE — 97166 OT EVAL MOD COMPLEX 45 MIN: CPT

## 2019-08-01 PROCEDURE — G8998 SWALLOW D/C STATUS: HCPCS | Mod: CI

## 2019-08-01 PROCEDURE — 97162 PT EVAL MOD COMPLEX 30 MIN: CPT

## 2019-08-01 RX ORDER — POLYETHYLENE GLYCOL 3350 17 G/17G
17 POWDER, FOR SOLUTION ORAL 2 TIMES DAILY
Status: DISCONTINUED | OUTPATIENT
Start: 2019-08-01 | End: 2019-08-02 | Stop reason: HOSPADM

## 2019-08-01 RX ORDER — DIPHENHYDRAMINE HCL 25 MG
25 CAPSULE ORAL EVERY 6 HOURS PRN
Status: DISCONTINUED | OUTPATIENT
Start: 2019-08-01 | End: 2019-08-02 | Stop reason: HOSPADM

## 2019-08-01 RX ORDER — ENOXAPARIN SODIUM 100 MG/ML
30 INJECTION SUBCUTANEOUS EVERY 24 HOURS
Status: DISCONTINUED | OUTPATIENT
Start: 2019-08-01 | End: 2019-08-02 | Stop reason: HOSPADM

## 2019-08-01 RX ORDER — OXYCODONE HYDROCHLORIDE 5 MG/1
5 TABLET ORAL EVERY 4 HOURS PRN
Status: DISCONTINUED | OUTPATIENT
Start: 2019-08-01 | End: 2019-08-02 | Stop reason: HOSPADM

## 2019-08-01 RX ORDER — DOCUSATE SODIUM 100 MG/1
100 CAPSULE, LIQUID FILLED ORAL 2 TIMES DAILY
Status: DISCONTINUED | OUTPATIENT
Start: 2019-08-01 | End: 2019-08-02 | Stop reason: HOSPADM

## 2019-08-01 RX ORDER — ALPRAZOLAM 0.5 MG/1
0.5 TABLET ORAL 2 TIMES DAILY PRN
Status: DISCONTINUED | OUTPATIENT
Start: 2019-08-01 | End: 2019-08-02 | Stop reason: HOSPADM

## 2019-08-01 RX ADMIN — LIOTHYRONINE SODIUM 5 MCG: 5 TABLET ORAL at 10:08

## 2019-08-01 RX ADMIN — POLYETHYLENE GLYCOL 3350 17 G: 17 POWDER, FOR SOLUTION ORAL at 09:08

## 2019-08-01 RX ADMIN — IPRATROPIUM BROMIDE AND ALBUTEROL SULFATE 3 ML: .5; 3 SOLUTION RESPIRATORY (INHALATION) at 07:08

## 2019-08-01 RX ADMIN — ACETAMINOPHEN 650 MG: 325 TABLET, FILM COATED ORAL at 05:08

## 2019-08-01 RX ADMIN — DOCUSATE SODIUM 100 MG: 100 CAPSULE, LIQUID FILLED ORAL at 09:08

## 2019-08-01 RX ADMIN — IPRATROPIUM BROMIDE AND ALBUTEROL SULFATE 3 ML: .5; 3 SOLUTION RESPIRATORY (INHALATION) at 08:08

## 2019-08-01 RX ADMIN — OXYCODONE HYDROCHLORIDE 5 MG: 5 TABLET ORAL at 12:08

## 2019-08-01 RX ADMIN — CEFAZOLIN SODIUM 1 G: 1 SOLUTION INTRAVENOUS at 06:08

## 2019-08-01 RX ADMIN — ALPRAZOLAM 0.5 MG: 0.5 TABLET ORAL at 05:08

## 2019-08-01 RX ADMIN — OXYCODONE HYDROCHLORIDE 5 MG: 5 TABLET ORAL at 01:08

## 2019-08-01 RX ADMIN — MIRTAZAPINE 7.5 MG: 7.5 TABLET ORAL at 09:08

## 2019-08-01 RX ADMIN — DIPHENHYDRAMINE HYDROCHLORIDE 25 MG: 25 CAPSULE ORAL at 12:08

## 2019-08-01 RX ADMIN — ENOXAPARIN SODIUM 30 MG: 100 INJECTION SUBCUTANEOUS at 05:08

## 2019-08-01 RX ADMIN — GUAIFENESIN AND DEXTROMETHORPHAN 15 MG: 100; 10 SYRUP ORAL at 06:08

## 2019-08-01 RX ADMIN — ACETAMINOPHEN 650 MG: 325 TABLET, FILM COATED ORAL at 10:08

## 2019-08-01 RX ADMIN — DOCUSATE SODIUM 100 MG: 100 CAPSULE, LIQUID FILLED ORAL at 10:08

## 2019-08-01 RX ADMIN — DEXTROSE AND SODIUM CHLORIDE: 5; .9 INJECTION, SOLUTION INTRAVENOUS at 02:08

## 2019-08-01 NOTE — PLAN OF CARE
Problem: Adult Inpatient Plan of Care  Goal: Plan of Care Review  Outcome: Ongoing (interventions implemented as appropriate)  Patient remains free from injury and falls. Pain moderately controlled with PRN analgesics. Dressing to left hip clean,dry, and intact. Preventative foam dressing applied to sacrum. Heels elevated. Patient repositioned throughout shift. IVF infusing. IV antibiotic administered as ordered. Vitale patent and draining. Patient able to make needs known. Plan of care continued.

## 2019-08-01 NOTE — ASSESSMENT & PLAN NOTE
Patient has sustain a unwitnessed fall at the nursing home today.  She underwent CT-head and CT-cervical spine and they were both negative for acute abnormalities.  She also underwent left knee pain radiograph which was negative but her left hip plain radiograph was significant for ...a comminuted intertrochanteric fracture of the left proximal femur with associated superior displacement and mild impaction.  Orthopedic Surgery, Dr. Monik Barker, was consulted and recommended operative repair.  Her Revised Cardiac Risk Index suggests that she is at very low risk for perioperative cardiac events. But her respiratory status ,put her at moderate risk,discuseed with family. Will provide supportive care and await further recommendations.  Ortho has been consulted.S/P IM nail on left hip.will continue with pain control,PT,PT

## 2019-08-01 NOTE — PROGRESS NOTES
[facility] Hospital Medicine  Progress Note    Patient Name: Lena Driscoll  MRN: [id number]  Patient Class: IP- Inpatient   Admission Date: 7/30/2019  Length of Stay: 2 days  Attending Physician: Atul Mcduffie MD  Primary Care Provider: Jeyson Garsia MD        Subjective:     Principal Problem:Closed comminuted intertrochanteric fracture of left femur      HPI:  Mrs. Lena Driscoll is a 84 y.o. female [facility] resident with COPD, chronic respiratory failure with hypoxia, hypothyroidism (TSH 0.08 Jan 2017), anemia of chronic disease, history of lung cancer, and history of TIA who presents to [facility] ED with complaints of fall today.  She complained of left hip pain after falling.  The fall was unwitnessed but she did report some head trauma without loss of consciousness.  She normally ambulates with a walker.  Further history is otherwise limited at this time due to her receiving a benzodiazepine prior to transfer to this unit.    Overview/Hospital Course:  Mrs. Lena Driscoll is a 84 y.o. female [facility] resident with COPD, chronic respiratory failure with hypoxia, hypothyroidism (TSH 0.08 Jan 2017), anemia of chronic disease, history of lung cancer, and history of TIA who presents to [facility] ED with complaints of fall .  She complained of left hip pain after falling.  The fall was unwitnessed but she did report some head trauma without loss of consciousness.  She normally ambulates with a walker.     Ortho has been consulted.S/P IM nail on left hip.will continue with pain control,PT,PT    Past Medical History:   Diagnosis Date    Cancer     lung    COPD (chronic obstructive pulmonary disease)     Stroke     TIA    Thyroid disease        Past Surgical History:   Procedure Laterality Date    back surgery 2018      BLADDER SUSPENSION      HYSTERECTOMY      ORIF, FRACTURE, FEMUR IM LONG NAIL TFN SYNTHES REP NOTIFIED BY DR. REECE Pickard 7/31/2019    Performed by Monik PACKER  Humberto AGUILERA MD at [facility] OR    TONSILLECTOMY, ADENOIDECTOMY         Review of patient's allergies indicates:   Allergen Reactions    Msg [glutamic acid hcl] Other (See Comments)     SEIZURE    Codeine Other (See Comments)     Unknown reaction    Penicillins Other (See Comments)     Unknown reaction    Morphine        No current facility-administered medications on file prior to encounter.      Current Outpatient Medications on File Prior to Encounter   Medication Sig    liothyronine (CYTOMEL) 5 MCG Tab     mirtazapine (REMERON) 15 MG tablet Take 7.5 mg by mouth every evening.    promethazine-dextromethorphan (PROMETHAZINE-DM) 6.25-15 mg/5 mL Syrp Take 5 mLs by mouth.    benzonatate (TESSALON) 100 MG capsule     mirabegron 50 mg Tb24 Take 1 tablet (50 mg total) by mouth once daily.    multivitamin with minerals tablet Take by mouth Daily. 1 Tablet Oral Every day    SYNTHROID 25 mcg tablet      Family History     Problem Relation (Age of Onset)    Breast cancer Sister (69)        Tobacco Use    Smoking status: Former Smoker    Smokeless tobacco: Never Used   Substance and Sexual Activity    Alcohol use: Yes     Alcohol/week: 0.0 oz     Comment: occ    Drug use: No    Sexual activity: Not Currently     Birth control/protection: Surgical     Review of Systems   Unable to perform ROS: Mental status change     Objective:     Vital Signs (Most Recent):  Temp: 97.8 °F (36.6 °C) (08/01/19 0753)  Pulse: 99 (08/01/19 0753)  Resp: 18 (08/01/19 0753)  BP: (!) 98/54 (08/01/19 0900)  SpO2: (!) 90 % (08/01/19 0753) Vital Signs (24h Range):  Temp:  [96.2 °F (35.7 °C)-97.9 °F (36.6 °C)] 97.8 °F (36.6 °C)  Pulse:  [80-99] 99  Resp:  [16-21] 18  SpO2:  [89 %-100 %] 90 %  BP: ()/(53-59) 98/54     Weight: 46.4 kg (102 lb 4.7 oz)  Body mass index is 20.66 kg/m².    Physical Exam   Constitutional: She appears well-developed and well-nourished. No distress.   HENT:   Head: Normocephalic and atraumatic.   Right Ear:  External ear normal.   Left Ear: External ear normal.   Nose: Nose normal.   Eyes: Right eye exhibits no discharge. Left eye exhibits no discharge.   Cardiovascular: Normal rate, regular rhythm, normal heart sounds and intact distal pulses. Exam reveals no gallop and no friction rub.   No murmur heard.  Pulmonary/Chest: Effort normal and breath sounds normal. No stridor. No respiratory distress. She has no wheezes. She has no rales. She exhibits no tenderness.   Abdominal: Soft. Bowel sounds are normal. She exhibits no distension. There is no tenderness. There is no rebound and no guarding.   Musculoskeletal:   Left lower extremity is shortened but internally rotated.  She has a neurologically intact left lower extremity.   Neurological:   Patient is somnolent but responds to pain and is protecting her airway   Skin: Skin is warm and dry. She is not diaphoretic. No erythema.   Nursing note and vitals reviewed.          Significant Labs: All pertinent labs within the past 24 hours have been reviewed.    Significant Imaging: I have reviewed and interpreted all pertinent imaging results/findings within the past 24 hours.      Assessment/Plan:      * Closed comminuted intertrochanteric fracture of left femur  Patient has sustain a unwitnessed fall at the nursing home today.  She underwent CT-head and CT-cervical spine and they were both negative for acute abnormalities.  She also underwent left knee pain radiograph which was negative but her left hip plain radiograph was significant for ...a comminuted intertrochanteric fracture of the left proximal femur with associated superior displacement and mild impaction.  Orthopedic Surgery, Dr. Monik Barker, was consulted and recommended operative repair.  Her Revised Cardiac Risk Index suggests that she is at very low risk for perioperative cardiac events. But her respiratory status ,put her at moderate risk,discuseed with family. Will provide supportive care and await  further recommendations.  Ortho has been consulted.S/P IM nail on left hip.will continue with pain control,PT,PT    History of transient ischemic attack (TIA)  Stable; there are no acute issues.    History of lung cancer  Stable; there are no acute issues.    Anemia of chronic disease  Unfortunately, we do not have previous lab work to which to compare to chronicity of her anemia, but she does not have any overt signs of bleeding; there is no indications for transfusion at this time.  Will continue to monitor.    Chronic respiratory failure with hypoxia  Will continue home supplemental oxygen therapy.    Chronic obstructive pulmonary disease  Clinically-stable without wheezing. on-needed BING/LAMA available.    Hypokalemia  Will replete orally and recheck her potassium level in the morning.    Acquired hypothyroidism  Poorly controlled; will continue her home regimen of levothyroxine and liothyronine and deferred dosage adjustments to her PCP.      VTE Risk Mitigation (From admission, onward)        Ordered     enoxaparin injection 30 mg  Daily      07/31/19 1513     Place sequential compression device  Until discontinued      07/31/19 1513     IP VTE HIGH RISK PATIENT  Once      07/31/19 0044                Atul Mcduffie MD  Department of Hospital Medicine   [facility]

## 2019-08-01 NOTE — PLAN OF CARE
"TN met with pt and pt's family at bedside. TN explained her role in Care Management. Pt voiced understanding. TN inquired about HELP AT HOME. Pt's daughter stated that she will have her at home to help for support. TN voiced understanding. TN inquired about responsibilities when it comes to MANAGING HER HEALTH at home and what it entails. Pt inquired about details. TN informed pt of RESPONSIBILITIES of:    1. Follow up appointments  2. Getting Prescriptions filled  3. Taking medications as prescribed.     Pt voiced understanding.  TN explained "My Health Packet" blue folder and the pink and green tabs that are on the folder as well.  Pt voiced understanding.     Pt's pharmacy:   [facility] [address] [address]  Phone: [phone] Fax: [phone]    Pt's preference for appointments: no preference       08/01/19 1543   Discharge Assessment   Assessment Type Discharge Planning Assessment   Confirmed/corrected address and phone number on facesheet? Yes   Assessment information obtained from? Caregiver  (Daughter, Claudia, [phone])   Communicated expected length of stay with patient/caregiver no   Prior to hospitilization cognitive status: Alert/Oriented   Prior to hospitalization functional status: Needs Assistance;Assistive Equipment   Current cognitive status: Alert/Oriented  (Very anxious)   Current Functional Status: Needs Assistance;Assistive Equipment   Facility Arrived From:   ([facility])   Lives With facility resident   Able to Return to Prior Arrangements yes   Is patient able to care for self after discharge? No   Who are your caregiver(s) and their phone number(s)?   (Claudia, [phone]; pt's daughter)   Patient's perception of discharge disposition admitted as an inpatient   Readmission Within the Last 30 Days no previous admission in last 30 days   Patient currently being followed by outpatient case management? No "   Patient currently receives any other outside agency services? No   Equipment Currently Used at Home walker, rolling;oxygen   Do you have any problems affording any of your prescribed medications? No   Is the patient taking medications as prescribed? yes   Does the patient have transportation home? Yes   Transportation Anticipated agency   Does the patient receive services at the [facility]? No   Discharge Plan A Skilled Nursing Facility  ([facility])   Discharge Plan B Home with family   DME Needed Upon Discharge  other (see comments)  (TBD)   Patient/Family in Agreement with Plan yes

## 2019-08-01 NOTE — PT/OT/SLP EVAL
Physical Therapy Evaluation    Patient Name:  Lena Driscoll   MRN:  [id number]    Recommendations:     Discharge Recommendations:  (back to [facility] with SNF)   Discharge Equipment Recommendations: none   Barriers to discharge: None    Assessment:     Lena Driscoll is a 84 y.o. female admitted with a medical diagnosis of Closed comminuted intertrochanteric fracture of left femur.  She presents with the following impairments/functional limitations:  weakness, impaired endurance, impaired self care skills, impaired functional mobilty, gait instability, impaired balance, decreased coordination, decreased lower extremity function, decreased safety awareness, pain, decreased ROM, impaired skin, edema, impaired cardiopulmonary response to activity.    Rehab Prognosis: Fair+; patient would benefit from acute skilled PT services to address these deficits and reach maximum level of function.    Recent Surgery: Procedure(s) (LRB):  ORIF, FRACTURE, FEMUR IM LONG NAIL TFN SYNTHES REP NOTIFIED BY DR. NEWELL (Left) 1 Day Post-Op    Plan:     During this hospitalization, patient to be seen daily to address the identified rehab impairments via gait training, therapeutic activities, therapeutic exercises and progress toward the following goals:    · Plan of Care Expires:  08/15/19    Subjective     Chief Complaint: pain and weakness  Patient/Family Comments/goals: to walk  Pain/Comfort:  · Pain Rating 1: (Pt with LLE pain with movement.)  · Pain Addressed 1: Pre-medicate for activity, Reposition, Distraction, Cessation of Activity    Living Environment:  Pt is a resident at [facility].    Prior to admission, patients level of function was ambulatory with RW and O2.  Equipment used at home: walker, rolling, oxygen, wheelchair.  Upon discharge, patient will have assistance from NH staff.    Objective:     Communicated with nurse Marie prior to session.  Patient found HOB elevated with oxygen, peripheral IV, telemetry, bed alarm   upon PT entry to room.    General Precautions: Standard, fall, respiratory   Orthopedic Precautions:LLE weight bearing as tolerated   Braces: N/A     Exams:  · Cognitive Exam:  Patient able to follow simple commands.    · Gross Motor Coordination:  impaired  · Postural Exam:  Patient presented with the following abnormalities:    · -       Rounded shoulders  · -       Kyphosis  · Skin Integrity/Edema:      · -       Skin integrity: Visible skin intact and L hip dressings intact/dry  · -       Edema: Mild LLE  · BLE ROM: WFL; LLE limited by pain  · BLE Strength: WFL    Functional Mobility: Pt required encouragement and extra time to complete tasks.    · Bed Mobility:     · Scooting: contact guard assistance and minimum assistance to scoot EOB  · Bridging: dependence and of 2 persons to reposition HOB  · Supine to Sit: maximal assistance and of 2 persons with HOB elevated   · Sit to Supine: dependence and of 2 persons  · Transfers:     · Sit to Stand:  minimum assistance and of 2 persons with rolling walker; Initially, pt was just fearful and needed encouragement.    · Gait: Pt ambulated ~3-4 sidesteps with min A using RW and O2 NC.  Pt with decreased weight shifting, foot clearance, step length, and abhijit.   · Balance: Pt with fair balance.      Therapeutic Activities and Exercises:  BLE seated therex as tolerated: hip flex, LAQ, and AP    AM-PAC 6 CLICK MOBILITY  Total Score:12     Patient left HOB elevated and BLE elevated on pillow with all lines intact, call button in reach, bed alarm on, nurse Cynthia notified and family present.    GOALS:   Multidisciplinary Problems     Physical Therapy Goals        Problem: Physical Therapy Goal    Goal Priority Disciplines Outcome Goal Variances Interventions   Physical Therapy Goal     PT, PT/OT      Description:  Goals to be met by: 8/15/19     Patient will increase functional independence with mobility by performin. Supine to sit with MInimal Assistance  2.  Rolling to Left and Right with Minimal Assistance  3. Sit to stand transfer with Minimal Assistance using RW  4. Bed to chair transfer with Minimal Assistance using Rolling Walker  5. Gait  x50 feet with Minimal Assistance using Rolling Walker and O2  6. Lower extremity exercise program 2 sets x10 reps per handout, with assistance as needed                      History:     Past Medical History:   Diagnosis Date    Cancer     lung    COPD (chronic obstructive pulmonary disease)     Stroke     TIA    Thyroid disease        Past Surgical History:   Procedure Laterality Date    back surgery 2018      BLADDER SUSPENSION      HYSTERECTOMY      ORIF, FRACTURE, FEMUR IM LONG NAIL TFN SYNTHES REP NOTIFIED BY DR. PAUL Left 7/31/2019    Performed by Monik Paul III, [facility] at [facility] OR    TONSILLECTOMY, ADENOIDECTOMY         Time Tracking:     PT Received On: 08/01/19  PT Start Time: 1546     PT Stop Time: 1611  PT Total Time (min): 25 min     Billable Minutes: Evaluation 15 min co-eval with REN Euceda, PT  08/01/2019

## 2019-08-01 NOTE — PROGRESS NOTES
POD 1 s/p LEFT IM nail  Doing well  Nurse reports NAEON  Pain controlled    PE:  aaox3   LEFT hip dsg c/d/i  NVI  xrays reviewed      A/p 89 y/p F POD 1 left im NAIL  dvt PPX  wbat  Pt DAILY  scD'S TEDS  Will follow

## 2019-08-01 NOTE — PLAN OF CARE
Problem: SLP Goal  Goal: SLP Goal  ST. Pt will participate in swallow eval to determine least restrictive diet without overt s/s of aspiration. - GOAL MET 19  Outcome: Outcome(s) achieved Date Met: 19 ST: Swallow eval completed. Rec: Po diet of dental soft with thin liquids & whole meds. No follow-up necessary at this time. Sol Mcadams, TURNER-SLP

## 2019-08-01 NOTE — PLAN OF CARE
Problem: Adult Inpatient Plan of Care  Goal: Patient-Specific Goal (Individualization)  Outcome: Ongoing (interventions implemented as appropriate)  Pt remains on 2 L/M NC. Continue duoneb svn tx and IS. Will continue to monitor. GERALDO Freitas, RRT

## 2019-08-01 NOTE — PT/OT/SLP EVAL
Speech Language Pathology Evaluation  Bedside Swallow    Patient Name:  Lena Driscoll   MRN:  [id number]  Admitting Diagnosis: Closed comminuted intertrochanteric fracture of left femur    Recommendations:                 General Recommendations:  Follow-up not indicated  Diet recommendations:  Dental Soft, Thin   Aspiration Precautions: 1 bite/sip at a time, Alternating bites/sips, Remain upright 30 minutes post meal, Small bites/sips and Standard aspiration precautions   General Precautions: Standard, fall, respiratory  Communication strategies:  none    History:     Past Medical History:   Diagnosis Date    Cancer     lung    COPD (chronic obstructive pulmonary disease)     Stroke     TIA    Thyroid disease        Past Surgical History:   Procedure Laterality Date    back surgery 2018      BLADDER SUSPENSION      HYSTERECTOMY      ORIF, FRACTURE, FEMUR IM LONG NAIL TFN SYNTHES REP NOTIFIED BY DR. REECE Pickard 7/31/2019    Performed by Monik Paul III, MD at [facility] OR    TONSILLECTOMY, ADENOIDECTOMY         Social History: Patient lives at [facility].    Modified Barium Swallow: n/a    Chest X-Rays: 7/30/19   1. Small right dependent pleural effusion.  2. Scattered reticular opacities within the middle and lower lung zones, possibly reflecting edema versus an infectious or non infectious inflammatory process.  No pneumothorax.    Prior diet: unrestricted    Subjective   Pt awake in bed upon SLP arrival. Nurse at bedside administered meds to pt. Son present for swallow eval.    Patient goals: To continue PO.      Pain/Comfort:  · Pain Rating 1: 4/10  · Location - Side 1: Left  · Location 1: hip  · Pain Addressed 1: Nurse notified  · Pain Rating Post-Intervention 1: 2/10    Objective:     Oral Musculature Evaluation  · Oral Musculature: WFL  · Dentition: present and adequate  · Secretion Management: adequate  · Mucosal Quality: good  · Mandibular Strength and Mobility: WFL  · Oral Labial Strength and  Mobility: WFL  · Lingual Strength and Mobility: WFL  · Velar Elevation: WFL  · Buccal Strength and Mobility: WFL  · Oral Musculature Comments: WFL for mastication & deglutition    Bedside Swallow Eval:   Consistencies Assessed:  · Thin liquids via straw x 6 trials self presented.  · Puree x 4 trials  · Soft solids x 3 trials     Oral Phase:   · WFL    Pharyngeal Phase:   · no overt clinical signs/symptoms of aspiration    Compensatory Strategies  · None    Treatment: Rec: dental soft diet with thin liquids & whole meds one at a time. No further ST is indicated at this time.    Assessment:     Lena Driscoll is a 84 y.o. female with a dx of Closed comminuted intertrochanteric fracture of left femur. Pt presents with a  functional swallow. Rec:dental soft diet with thin liquids & whole meds one at a time. No further ST is indicated at this time.    Goals:   Multidisciplinary Problems     SLP Goals     Not on file          Multidisciplinary Problems (Resolved)        Problem: SLP Goal    Goal Priority Disciplines Outcome   SLP Goal   (Resolved)    Low SLP Outcome(s) achieved   Description:  ST. Pt will participate in swallow eval to determine least restrictive diet without overt s/s of aspiration. - GOAL MET 19                    Plan:     · Patient to be seen:      · Plan of Care expires:  19  · Plan of Care reviewed with:  patient, son   · SLP Follow-Up:  No       Discharge recommendations:  other (see comments)(No further ST is warranted at this time)   Barriers to Discharge:  None    Time Tracking:     SLP Treatment Date:   19  Speech Start Time:  1007  Speech Stop Time:  1023     Speech Total Time (min):  16 min    Billable Minutes: Eval Swallow and Oral Function 16 min    Sol Mcadams CCC-SLP  2019

## 2019-08-01 NOTE — SUBJECTIVE & OBJECTIVE
Past Medical History:   Diagnosis Date    Cancer     lung    COPD (chronic obstructive pulmonary disease)     Stroke     TIA    Thyroid disease        Past Surgical History:   Procedure Laterality Date    back surgery 2018      BLADDER SUSPENSION      HYSTERECTOMY      ORIF, FRACTURE, FEMUR IM LONG NAIL TFN SYNTHES REP NOTIFIED BY DR. PAUL Left 7/31/2019    Performed by Monik Paul III, MD at [facility] OR    TONSILLECTOMY, ADENOIDECTOMY         Review of patient's allergies indicates:   Allergen Reactions    Msg [glutamic acid hcl] Other (See Comments)     SEIZURE    Codeine Other (See Comments)     Unknown reaction    Penicillins Other (See Comments)     Unknown reaction    Morphine        No current facility-administered medications on file prior to encounter.      Current Outpatient Medications on File Prior to Encounter   Medication Sig    liothyronine (CYTOMEL) 5 MCG Tab     mirtazapine (REMERON) 15 MG tablet Take 7.5 mg by mouth every evening.    promethazine-dextromethorphan (PROMETHAZINE-DM) 6.25-15 mg/5 mL Syrp Take 5 mLs by mouth.    benzonatate (TESSALON) 100 MG capsule     mirabegron 50 mg Tb24 Take 1 tablet (50 mg total) by mouth once daily.    multivitamin with minerals tablet Take by mouth Daily. 1 Tablet Oral Every day    SYNTHROID 25 mcg tablet      Family History     Problem Relation (Age of Onset)    Breast cancer Sister (69)        Tobacco Use    Smoking status: Former Smoker    Smokeless tobacco: Never Used   Substance and Sexual Activity    Alcohol use: Yes     Alcohol/week: 0.0 oz     Comment: occ    Drug use: No    Sexual activity: Not Currently     Birth control/protection: Surgical     Review of Systems   Unable to perform ROS: Mental status change     Objective:     Vital Signs (Most Recent):  Temp: 97.8 °F (36.6 °C) (08/01/19 0753)  Pulse: 99 (08/01/19 0753)  Resp: 18 (08/01/19 0753)  BP: (!) 98/54 (08/01/19 0900)  SpO2: (!) 90 % (08/01/19 0753) Vital Signs (24h  Range):  Temp:  [96.2 °F (35.7 °C)-97.9 °F (36.6 °C)] 97.8 °F (36.6 °C)  Pulse:  [80-99] 99  Resp:  [16-21] 18  SpO2:  [89 %-100 %] 90 %  BP: ()/(53-59) 98/54     Weight: 46.4 kg (102 lb 4.7 oz)  Body mass index is 20.66 kg/m².    Physical Exam   Constitutional: She appears well-developed and well-nourished. No distress.   HENT:   Head: Normocephalic and atraumatic.   Right Ear: External ear normal.   Left Ear: External ear normal.   Nose: Nose normal.   Eyes: Right eye exhibits no discharge. Left eye exhibits no discharge.   Cardiovascular: Normal rate, regular rhythm, normal heart sounds and intact distal pulses. Exam reveals no gallop and no friction rub.   No murmur heard.  Pulmonary/Chest: Effort normal and breath sounds normal. No stridor. No respiratory distress. She has no wheezes. She has no rales. She exhibits no tenderness.   Abdominal: Soft. Bowel sounds are normal. She exhibits no distension. There is no tenderness. There is no rebound and no guarding.   Musculoskeletal:   Left lower extremity is shortened but internally rotated.  She has a neurologically intact left lower extremity.   Neurological:   Patient is somnolent but responds to pain and is protecting her airway   Skin: Skin is warm and dry. She is not diaphoretic. No erythema.   Nursing note and vitals reviewed.          Significant Labs: All pertinent labs within the past 24 hours have been reviewed.    Significant Imaging: I have reviewed and interpreted all pertinent imaging results/findings within the past 24 hours.

## 2019-08-01 NOTE — NURSING
Patient complaining of itching post morphine administration at 2137. Patient has morphine as allergy. Will notify MD.

## 2019-08-01 NOTE — HOSPITAL COURSE
Mrs. Lena Driscoll is a 84 y.o. female [facility] resident with COPD, chronic respiratory failure with hypoxia, hypothyroidism (TSH 0.08 Jan 2017), anemia of chronic disease, history of lung cancer, and history of TIA who presents to [facility] ED with complaints of fall .  She complained of left hip pain after falling.  The fall was unwitnessed but she did report some head trauma without loss of consciousness. She normally ambulates with a walker.CT hip show,Acute comminuted intertrochanteric left proximal femur fracture.  Ortho was consulted.she was at moderate risk duo to history of respiratory failure,COPD,lung cancer,S/P IM nail on left hip.will continued with pain control,PT,PT,she remains stable,patient has wayne discharged to SNF with PCP and ortho. follow up.

## 2019-08-01 NOTE — PLAN OF CARE
Problem: Physical Therapy Goal  Goal: Physical Therapy Goal  Goals to be met by: 8/15/19     Patient will increase functional independence with mobility by performin. Supine to sit with MInimal Assistance  2. Rolling to Left and Right with Minimal Assistance  3. Sit to stand transfer with Minimal Assistance using RW  4. Bed to chair transfer with Minimal Assistance using Rolling Walker  5. Gait  x50 feet with Minimal Assistance using Rolling Walker and O2  6. Lower extremity exercise program 2 sets x10 reps per handout, with assistance as needed    Pt ambulated ~3-4 sidesteps along bedside with min A using RW and O2.

## 2019-08-01 NOTE — PLAN OF CARE
Problem: Occupational Therapy Goal  Goal: Occupational Therapy Goal  Goals to be met by: 8/15/19    Patient will increase functional independence with ADLs by performing:    UE Dressing with Stand-by Assistance.  LE Dressing with Minimal Assistance.  Grooming while standing at sink with Stand-by Assistance.  Supine to sit with Minimal Assistance.  Step transfer with Contact Guard Assistance  Toilet transfer to bedside commode with Contact Guard Assistance.  Upper extremity exercise program x10 reps per handout, with assistance as needed.    Outcome: Ongoing (interventions implemented as appropriate)  The patient participated in OT eval with encouragement. Patient will benefit from OT to address functional deficits.    Comments: The patient will benefit from SNF.

## 2019-08-02 VITALS
DIASTOLIC BLOOD PRESSURE: 68 MMHG | BODY MASS INDEX: 20.56 KG/M2 | TEMPERATURE: 98 F | RESPIRATION RATE: 17 BRPM | HEIGHT: 59 IN | HEART RATE: 87 BPM | SYSTOLIC BLOOD PRESSURE: 115 MMHG | WEIGHT: 102 LBS | OXYGEN SATURATION: 97 %

## 2019-08-02 LAB
ALBUMIN SERPL BCP-MCNC: 2.2 G/DL (ref 3.5–5.2)
ALP SERPL-CCNC: 51 U/L (ref 55–135)
ALT SERPL W/O P-5'-P-CCNC: 6 U/L (ref 10–44)
ANION GAP SERPL CALC-SCNC: 4 MMOL/L (ref 8–16)
AST SERPL-CCNC: 11 U/L (ref 10–40)
BASOPHILS # BLD AUTO: 0.02 K/UL (ref 0–0.2)
BASOPHILS NFR BLD: 0.2 % (ref 0–1.9)
BILIRUB SERPL-MCNC: 0.7 MG/DL (ref 0.1–1)
BUN SERPL-MCNC: 7 MG/DL (ref 8–23)
CALCIUM SERPL-MCNC: 8.1 MG/DL (ref 8.7–10.5)
CHLORIDE SERPL-SCNC: 99 MMOL/L (ref 95–110)
CO2 SERPL-SCNC: 29 MMOL/L (ref 23–29)
CREAT SERPL-MCNC: 0.5 MG/DL (ref 0.5–1.4)
DIFFERENTIAL METHOD: ABNORMAL
EOSINOPHIL # BLD AUTO: 0.2 K/UL (ref 0–0.5)
EOSINOPHIL NFR BLD: 1.5 % (ref 0–8)
ERYTHROCYTE [DISTWIDTH] IN BLOOD BY AUTOMATED COUNT: 15.5 % (ref 11.5–14.5)
EST. GFR  (AFRICAN AMERICAN): >60 ML/MIN/1.73 M^2
EST. GFR  (NON AFRICAN AMERICAN): >60 ML/MIN/1.73 M^2
GLUCOSE SERPL-MCNC: 93 MG/DL (ref 70–110)
HCT VFR BLD AUTO: 27.6 % (ref 37–48.5)
HGB BLD-MCNC: 9.2 G/DL (ref 12–16)
LYMPHOCYTES # BLD AUTO: 0.9 K/UL (ref 1–4.8)
LYMPHOCYTES NFR BLD: 7.8 % (ref 18–48)
MCH RBC QN AUTO: 29.7 PG (ref 27–31)
MCHC RBC AUTO-ENTMCNC: 33.3 G/DL (ref 32–36)
MCV RBC AUTO: 89 FL (ref 82–98)
MONOCYTES # BLD AUTO: 1 K/UL (ref 0.3–1)
MONOCYTES NFR BLD: 8.9 % (ref 4–15)
NEUTROPHILS # BLD AUTO: 9.6 K/UL (ref 1.8–7.7)
NEUTROPHILS NFR BLD: 81.9 % (ref 38–73)
PLATELET # BLD AUTO: 232 K/UL (ref 150–350)
PMV BLD AUTO: 9.7 FL (ref 9.2–12.9)
POTASSIUM SERPL-SCNC: 3.7 MMOL/L (ref 3.5–5.1)
PROT SERPL-MCNC: 5.1 G/DL (ref 6–8.4)
RBC # BLD AUTO: 3.1 M/UL (ref 4–5.4)
SODIUM SERPL-SCNC: 132 MMOL/L (ref 136–145)
WBC # BLD AUTO: 11.72 K/UL (ref 3.9–12.7)

## 2019-08-02 PROCEDURE — 97535 SELF CARE MNGMENT TRAINING: CPT

## 2019-08-02 PROCEDURE — 25000242 PHARM REV CODE 250 ALT 637 W/ HCPCS: Performed by: ORTHOPAEDIC SURGERY

## 2019-08-02 PROCEDURE — 85025 COMPLETE CBC W/AUTO DIFF WBC: CPT

## 2019-08-02 PROCEDURE — 97530 THERAPEUTIC ACTIVITIES: CPT

## 2019-08-02 PROCEDURE — 97110 THERAPEUTIC EXERCISES: CPT

## 2019-08-02 PROCEDURE — 25000003 PHARM REV CODE 250: Performed by: ORTHOPAEDIC SURGERY

## 2019-08-02 PROCEDURE — 94799 UNLISTED PULMONARY SVC/PX: CPT

## 2019-08-02 PROCEDURE — 94761 N-INVAS EAR/PLS OXIMETRY MLT: CPT

## 2019-08-02 PROCEDURE — 80053 COMPREHEN METABOLIC PANEL: CPT

## 2019-08-02 PROCEDURE — 94640 AIRWAY INHALATION TREATMENT: CPT

## 2019-08-02 PROCEDURE — 97116 GAIT TRAINING THERAPY: CPT

## 2019-08-02 PROCEDURE — 36415 COLL VENOUS BLD VENIPUNCTURE: CPT

## 2019-08-02 PROCEDURE — 25000003 PHARM REV CODE 250: Performed by: HOSPITALIST

## 2019-08-02 PROCEDURE — 27000221 HC OXYGEN, UP TO 24 HOURS

## 2019-08-02 RX ORDER — ALPRAZOLAM 0.5 MG/1
0.5 TABLET ORAL 2 TIMES DAILY PRN
Qty: 20 TABLET | Refills: 0 | Status: SHIPPED | OUTPATIENT
Start: 2019-08-02 | End: 2019-08-12

## 2019-08-02 RX ORDER — IPRATROPIUM BROMIDE AND ALBUTEROL SULFATE 2.5; .5 MG/3ML; MG/3ML
3 SOLUTION RESPIRATORY (INHALATION)
Qty: 1 BOX | Refills: 0
Start: 2019-08-02 | End: 2020-08-01

## 2019-08-02 RX ORDER — POLYETHYLENE GLYCOL 3350 17 G/17G
17 POWDER, FOR SOLUTION ORAL 2 TIMES DAILY
Refills: 0
Start: 2019-08-02

## 2019-08-02 RX ORDER — DOCUSATE SODIUM 100 MG/1
100 CAPSULE, LIQUID FILLED ORAL 2 TIMES DAILY PRN
Refills: 0
Start: 2019-08-02

## 2019-08-02 RX ORDER — OXYCODONE HYDROCHLORIDE 5 MG/1
5 TABLET ORAL EVERY 6 HOURS PRN
Qty: 25 TABLET | Refills: 0 | Status: SHIPPED | OUTPATIENT
Start: 2019-08-02 | End: 2019-08-09

## 2019-08-02 RX ADMIN — OXYCODONE HYDROCHLORIDE 5 MG: 5 TABLET ORAL at 11:08

## 2019-08-02 RX ADMIN — OXYCODONE HYDROCHLORIDE 5 MG: 5 TABLET ORAL at 03:08

## 2019-08-02 RX ADMIN — IPRATROPIUM BROMIDE AND ALBUTEROL SULFATE 3 ML: .5; 3 SOLUTION RESPIRATORY (INHALATION) at 01:08

## 2019-08-02 RX ADMIN — IPRATROPIUM BROMIDE AND ALBUTEROL SULFATE 3 ML: .5; 3 SOLUTION RESPIRATORY (INHALATION) at 07:08

## 2019-08-02 RX ADMIN — ACETAMINOPHEN 650 MG: 325 TABLET, FILM COATED ORAL at 01:08

## 2019-08-02 RX ADMIN — ACETAMINOPHEN 650 MG: 325 TABLET, FILM COATED ORAL at 12:08

## 2019-08-02 RX ADMIN — ACETAMINOPHEN 650 MG: 325 TABLET, FILM COATED ORAL at 06:08

## 2019-08-02 RX ADMIN — LIOTHYRONINE SODIUM 5 MCG: 5 TABLET ORAL at 10:08

## 2019-08-02 RX ADMIN — DOCUSATE SODIUM 100 MG: 100 CAPSULE, LIQUID FILLED ORAL at 10:08

## 2019-08-02 RX ADMIN — POLYETHYLENE GLYCOL 3350 17 G: 17 POWDER, FOR SOLUTION ORAL at 10:08

## 2019-08-02 NOTE — ANESTHESIA POSTPROCEDURE EVALUATION
Anesthesia Post Evaluation    Patient: Lena Driscoll    Procedure(s) Performed: Procedure(s) (LRB):  ORIF, FRACTURE, FEMUR IM LONG NAIL TFN SYNTHES REP NOTIFIED BY DR. NEWELL (Left)    Final Anesthesia Type: general  Patient location during evaluation: PACU  Patient participation: Yes- Able to Participate  Level of consciousness: awake and alert  Post-procedure vital signs: reviewed and stable  Pain management: adequate  Airway patency: patent  PONV status at discharge: No PONV  Anesthetic complications: no      Cardiovascular status: blood pressure returned to baseline and hemodynamically stable  Respiratory status: unassisted and spontaneous ventilation  Hydration status: euvolemic  Follow-up not needed.          Vitals Value Taken Time   /59 8/1/2019  4:56 PM   Temp 37.1 °C (98.7 °F) 8/1/2019  4:56 PM   Pulse 102 8/1/2019  4:56 PM   Resp 24 8/1/2019  4:56 PM   SpO2 90 % 8/1/2019  4:56 PM         Event Time     Out of Recovery 07/31/2019 14:45:23          Pain/Jony Score: Pain Rating Prior to Med Admin: 5 (8/1/2019  5:07 PM)  Pain Rating Post Med Admin: 5 (8/1/2019  6:07 PM)  Jony Score: 9 (7/31/2019  2:34 PM)

## 2019-08-02 NOTE — PLAN OF CARE
"EDUCATION:  TN provided with educational information on Post-Op Instructions with daughter, Claudia,.  Information reviewed and placed in :My Healthcare Packet" to be brought home for pt to use as resource after discharge.  Information included:  signs and symptoms to look for and call the doctor if experiencing, and symptoms that may indicate a medical emergency: CALL 911.  All questions answered. Teach back method used. Patient's daughter stated, "I will contact on-call nurses if we need assistance".    TN informed floor nurse, Cynthia, care management is complete and can proceed with discharge teaching.       08/02/19 1518   Final Note   Assessment Type Final Discharge Note   Anticipated Discharge Disposition SNF   What phone number can be called within the next 1-3 days to see how you are doing after discharge?   (Listed in chart)   Hospital Follow Up  Appt(s) scheduled? Yes   Discharge plans and expectations educations in teach back method with documentation complete? Yes   Right Care Referral Info   Post Acute Recommendation SNF / Sub-Acute Rehab           "

## 2019-08-02 NOTE — PLAN OF CARE
[facility]     Department of Hospital Medicine     [address] [place] [address]     [phone] after hours  [phone] fax       NURSING HOME ORDERS    08/02/2019    Admit to Nursing Home:  Skilled Bed                                              Diagnoses:  Active Hospital Problems    Diagnosis  POA    *Closed comminuted intertrochanteric fracture of left femur [S72.142A]  Yes    Hypokalemia [E87.6]  Yes    Chronic obstructive pulmonary disease [J44.9]  Yes     Chronic    Chronic respiratory failure with hypoxia [J96.11]  Yes     Chronic    Anemia of chronic disease [D63.8]  Yes     Chronic    History of lung cancer [Z85.118]  Not Applicable     Chronic    History of transient ischemic attack (TIA) [Z86.73]  Not Applicable     Chronic    Fall [W19.XXXA]  Yes    Acquired hypothyroidism [E03.9]  Yes     Chronic      Resolved Hospital Problems   No resolved problems to display.       Patient is homebound due to:  Closed comminuted intertrochanteric fracture of left femur    Allergies:  Review of patient's allergies indicates:   Allergen Reactions    Msg [glutamic acid hcl] Other (See Comments)     SEIZURE    Codeine Other (See Comments)     Unknown reaction    Penicillins Other (See Comments)     Unknown reaction    Morphine        Vitals:       Every shift (Skilled Nursing patients)    Diet: dental soft,aspiration precaution     Acitivities:      - Up in a chair each morning as tolerated   - Ambulate with assistance to bathroom     - May use walker, cane, or self-propelled wheelchair   - Weight bearing: as tolerated.    LABS:  Per facility protocol    Nursing Precautions:     - Aspiration precautions:             - Total assistance with meals            -  Upright 90 degrees befor during and after meals             -  Suction at bedside          - Fall precautions per nursing home protocol  Dry dressing on left hip every other day    - Decubitus  precautions:        -  for positioning   - Pressure reducing foam mattress   - Turn patient every two hours. Use wedge pillows to anchor patient    CONSULTS:     Physical Therapy to evaluate and treat     Occupational Therapy to evaluate and treat        MISCELLANEOUS CARE:              Respiratory,keep on 3 liter Nc O 2     Routine Skin for Bedridden Patients:  Apply moisture barrier cream to all    skin folds and wet areas in perineal area daily and after baths and                           all bowel movements.      Follow up with  in 1-2 weeks                                 Medications: Discontinue all previous medication orders, if any. See new list below.     Lena Driscoll   Home Medication Instructions KATARZYNA:[id number]    Printed on:08/02/19 0138   Medication Information                      albuterol-ipratropium (DUO-NEB) 2.5 mg-0.5 mg/3 mL nebulizer solution  Take 3 mLs by nebulization every 6 (six) hours while awake. Rescue             ALPRAZolam (XANAX) 0.5 MG tablet  Take 1 tablet (0.5 mg total) by mouth 2 (two) times daily as needed for Anxiety.             benzonatate (TESSALON) 100 MG capsule po TID prn for cough                docusate sodium (COLACE) 100 MG capsule  Take 1 capsule (100 mg total) by mouth 2 (two) times daily as needed for Constipation.             liothyronine (CYTOMEL) 5 MCG Tab po daily                mirabegron 50 mg Tb24  Take 1 tablet (50 mg total) by mouth once daily.             mirtazapine (REMERON) 15 MG tablet  Take 7.5 mg by mouth every evening.             multivitamin with minerals tablet  Take by mouth Daily. 1 Tablet Oral Every day             oxyCODONE (ROXICODONE) 5 MG immediate release tablet  Take 1 tablet (5 mg total) by mouth every 6 (six) hours as needed.for pain.             polyethylene glycol (GLYCOLAX) 17 gram PwPk  Take 17 g by mouth 2 (two) times daily.                                    _________________________________  Atul  MD Froy  08/02/2019

## 2019-08-02 NOTE — PROGRESS NOTES
1016 TN sent clinicals via [employer]; awaiting review and call report.    TN contacted [facility] at [phone]; spoke with Danyelle of Admissions; [facility].    1235 TN uploaded Rxs via [employer]. Was contacted by [facility], stating they have [employer] authorization. TN contacted [facility], via telephone, left message, Rxs uploaded, awaiting call report.    1239 TN contacted pt's daughter, Claudia, at [phone] to inform pt is discharged today and will transport back to [facility], will contact again once transportation is arranged; left message to contact TN if she has questions.    1400 TN received a call from pt's daughter, Claudia. TN provided the above update. Informed awaiting call report and will arrange transportation. TN will contact once arranged.    1428 ADT 30 order placed for transportation.  ETA: 3:30 PM. If transportation does not arrive at ETA time nurse, Cynthia, will be instructed to follow protocol for transportation below:  How can I get in touch directly with dispatch, if needed?                 Non-emergent dispatch: [phone]                                    Emergent dispatch: [phone]  Non-emergent (stretcher): [phone]  Escalation Needs (PFC Lead): [phone] TN contacted pt's daughter, Claudia, informed of transportation arrangement and pt had been seen by PT/OT.    144 TN contacted Dr. Paul's office at [phone]; spoke with Adrianna who scheduled an orthopedic f/u appt on 08/13/19 at 1:45 PM.

## 2019-08-02 NOTE — PLAN OF CARE
Problem: Adult Inpatient Plan of Care  Goal: Plan of Care Review  Outcome: Ongoing (interventions implemented as appropriate)  Monitored freq.throughout the shift. Pt.resting at present with no complaints and no acute distress noted. Vitale catheter d/c earlier & pt.voioding in diaper w/o difficulty. Dressing to lt.hip/thigh area intact & dry.  visited earlier & spoke with throughtout the shift re: bp,anxiety,etc. with new orders noted. Med.for pain, anxiety,cough,etc. & med.helpful. Call light in reach.

## 2019-08-02 NOTE — PT/OT/SLP PROGRESS
Physical Therapy Treatment    Patient Name:  Lena Driscoll   MRN:  [id number]    Recommendations:     Discharge Recommendations:  nursing facility, skilled   Discharge Equipment Recommendations: none   Barriers to discharge: None    Assessment:     Lena Driscoll is a 84 y.o. female admitted with a medical diagnosis of Closed comminuted intertrochanteric fracture of left femur.  She presents with the following impairments/functional limitations:  weakness, impaired endurance, impaired self care skills, gait instability, impaired balance, decreased upper extremity function, decreased lower extremity function, decreased ROM, impaired functional mobilty, decreased safety awareness, decreased coordination, pain, impaired cardiopulmonary response to activity, edema, orthopedic precautions, impaired joint extensibility, impaired muscle length .     Rehab Prognosis: Good; patient would benefit from acute skilled PT services to address these deficits and reach maximum level of function.    Recent Surgery: Procedure(s) (LRB):  ORIF, FRACTURE, FEMUR IM LONG NAIL TFN SYNTHES REP NOTIFIED BY DR. NEWELL (Left) 2 Days Post-Op    Plan:     During this hospitalization, patient to be seen daily to address the identified rehab impairments via gait training, therapeutic activities, therapeutic exercises and progress toward the following goals:    · Plan of Care Expires:  08/15/19    Subjective     Chief Complaint: weakness , pain and fear of falling   Patient/Family Comments/goals: pt agreeable to treatment   Pain/Comfort:  · Pain Rating 1: 6/10  · Location - Side 1: Left  · Location 1: hip  · Pain Addressed 1: Pre-medicate for activity, Nurse notified, Distraction, Reposition      Objective:     Communicated with nurse Ballard  prior to session.  Patient found HOB elevated with bed alarm, telemetry, oxygen upon PT entry to room.     General Precautions: Standard, fall, respiratory   Orthopedic Precautions:LLE weight bearing as tolerated    Braces: N/A     Functional Mobility: pt with fear of falling and anxious . Pt required extra time and encouragement to reassure and for comfort .    · Bed Mobility:    · Rolling Left:  moderate assistance  · Rolling Right: moderate assistance  · Scooting: moderate assistance  · Supine to Sit: moderate assistance and of 2 persons, HOB elevated ,bedside rail   · Sit to Supine: maximal assistance and of 2 persons   · Transfers:     · Sit to Stand:  moderate assistance x 2 persons from bed with RW  and max A x 2 with hand-held assist from chair . V/T cues for safety technique and walker management.   · Bed to chair : mod A , RW using step pivot transfer   · Chair to bed : maximal assistance and of 2 persons with  hand-held assist  using  Stand Pivot  · Gait: Patient ambulated ~  4 ft and 3-4 steps on level tile with Rolling Walker with Mod  Assistance (chair followed , 3L O2NC) .  Pt with demonstarting a  3-point gait with max slow abhijit, decreased velocity of limb motion, decreased step length, decreased swing-to-stance ratio, decreased toe-to-floor clearance and decreased weight-shifting ability.Impairments contributing to gait deviations include impaired balance, decreased flexibility, pain, impaired postural control, decreased ROM and decreased strength. V/T cues for safety technique and walker management. Pt with fear of falling.   · Balance:  Poor       AM-PAC 6 CLICK MOBILITY  Turning over in bed (including adjusting bedclothes, sheets and blankets)?: 2  Sitting down on and standing up from a chair with arms (e.g., wheelchair, bedside commode, etc.): 2  Moving from lying on back to sitting on the side of the bed?: 2  Moving to and from a bed to a chair (including a wheelchair)?: 2  Need to walk in hospital room?: 2  Climbing 3-5 steps with a railing?: 1  Basic Mobility Total Score: 11       Therapeutic Activities and Exercises:   Pt performed seated BLE 10 reps x 2 trials:   AP, LAQ, HS,  Hip abd/add , Hip  flexion (AAROM LLE) . V/T's cues for technique and sequence. Pt tolerated well.   Educated pt on safety awareness with all OOB mobility , transfer and gait training.     Patient left HOB elevated with pressure relief boot and SCD placed  all lines intact, call button in reach, bed alarm on and nurse Nellie notified..    GOALS:   Multidisciplinary Problems     Physical Therapy Goals        Problem: Physical Therapy Goal    Goal Priority Disciplines Outcome Goal Variances Interventions   Physical Therapy Goal     PT, PT/OT Ongoing (interventions implemented as appropriate)     Description:  Goals to be met by: 8/15/19     Patient will increase functional independence with mobility by performin. Supine to sit with MInimal Assistance  2. Rolling to Left and Right with Minimal Assistance  3. Sit to stand transfer with Minimal Assistance using RW  4. Bed to chair transfer with Minimal Assistance using Rolling Walker  5. Gait  x50 feet with Minimal Assistance using Rolling Walker and O2  6. Lower extremity exercise program 2 sets x10 reps per handout, with assistance as needed                      Time Tracking:     PT Received On: 19  PT Start Time: 1219     PT Stop Time: 1330  PT Total Time (min): 71 min     Billable Minutes: Gait Training 12, Therapeutic Activity 12 and Therapeutic Exercise 14 total 71 min co-treat with LUCERO     Treatment Type: Treatment  PT/PTA: PTA     PTA Visit Number: 1     Beatriz Quinteroh, PTA  2019

## 2019-08-02 NOTE — DISCHARGE SUMMARY
[facility] Hospital Medicine  Discharge Summary      Patient Name: Lena Driscoll  MRN: [id number]  Admission Date: 7/30/2019  Hospital Length of Stay: 3 days  Discharge Date and Time:  08/02/2019 2:05 PM  Attending Physician: Atul Mcduffie MD   Discharging Provider: Atul Mcduffie MD  Primary Care Provider: Jeyson Garsia MD      HPI:   Mrs. Lena Driscoll is a 84 y.o. female [facility] resident with COPD, chronic respiratory failure with hypoxia, hypothyroidism (TSH 0.08 Jan 2017), anemia of chronic disease, history of lung cancer, and history of TIA who presents to [facility] ED with complaints of fall today.  She complained of left hip pain after falling.  The fall was unwitnessed but she did report some head trauma without loss of consciousness.  She normally ambulates with a walker.  Further history is otherwise limited at this time due to her receiving a benzodiazepine prior to transfer to this unit.    Procedure(s) (LRB):  ORIF, FRACTURE, FEMUR IM LONG NAIL TFN SYNTHES REP NOTIFIED BY DR. NEWELL (Left)      Hospital Course:   Mrs. Lena Driscoll is a 84 y.o. female [facility] resident with COPD, chronic respiratory failure with hypoxia, hypothyroidism (TSH 0.08 Jan 2017), anemia of chronic disease, history of lung cancer, and history of TIA who presents to [facility] ED with complaints of fall .  She complained of left hip pain after falling.  The fall was unwitnessed but she did report some head trauma without loss of consciousness. She normally ambulates with a walker.CT hip show,Acute comminuted intertrochanteric left proximal femur fracture.  Ortho was consulted.she was at moderate risk duo to history of respiratory failure,COPD,lung cancer,S/P IM nail on left hip.will continued with pain control,PT,PT,she remains stable,patient has wayne discharged to SNF with PCP and ortho. follow up.     Consults:   Consults (From admission, onward)        Status Ordering Provider      Inpatient consult to Orthopedics  Once     Provider:  Parminder Paul III, MD    Completed DAYANNA LUND     Inpatient consult to Social Work  Once     Provider:  (Not yet assigned)    Acknowledged CATALINA VERONICA consult to case management  Once     Provider:  (Not yet assigned)    Acknowledged PARMINDER PAUL III          No new Assessment & Plan notes have been filed under this hospital service since the last note was generated.  Service: Hospital Medicine    Final Active Diagnoses:    Diagnosis Date Noted POA    PRINCIPAL PROBLEM:  Closed comminuted intertrochanteric fracture of left femur [S72.142A] 07/30/2019 Yes    Hypokalemia [E87.6] 07/31/2019 Yes    Chronic obstructive pulmonary disease [J44.9] 07/31/2019 Yes     Chronic    Chronic respiratory failure with hypoxia [J96.11] 07/31/2019 Yes     Chronic    Anemia of chronic disease [D63.8] 07/31/2019 Yes     Chronic    History of lung cancer [Z85.118] 07/31/2019 Not Applicable     Chronic    History of transient ischemic attack (TIA) [Z86.73] 07/31/2019 Not Applicable     Chronic    Fall [W19.XXXA] 07/31/2019 Yes    Acquired hypothyroidism [E03.9] 01/08/2017 Yes     Chronic      Problems Resolved During this Admission:       Discharged Condition: stable    Disposition: Skilled Nursing Facility    Follow Up:  Follow-up Information     Parminder Paul III, MD In 1 week.    Specialty:  Orthopedic Surgery  Contact information:  [address]  [facility] [address]  [phone]             [facility].    Specialties:  Nursing Home Agency, SNF Agency  Contact information:  [address]  [place] [address] [phone]                 Patient Instructions:      Activity as tolerated       Significant Diagnostic Studies: Labs:   BMP:   Recent Labs   Lab 08/01/19  0952 08/02/19  0530   * 93   * 132*   K 3.5 3.7   CL 98 99   CO2 25 29   BUN 8 7*   CREATININE 0.6 0.5   CALCIUM 8.2* 8.1*    and CBC    Recent Labs   Lab 08/01/19  0952 08/02/19  0530   WBC 12.61 11.72   HGB 9.2* 9.2*   HCT 29.6* 27.6*    232     Radiology: X-Ray: CXR: X-Ray Chest 1 View (CXR): No results found for this visit on 07/30/19.  CT scan:Hip   Cardiac Graphics: Echocardiogram:   2D echo with color flow doppler: No results found for this or any previous visit. and Transthoracic echo (TTE) complete (Cupid Only):   Results for orders placed or performed during the hospital encounter of 07/30/19   Transthoracic echo (TTE) 2D with Color Flow   Result Value Ref Range    BSA 1.39 m2    LA WIDTH 3.71 cm    AORTIC VALVE CUSP SEPERATION 1.55 cm    PV PEAK VELOCITY 0.81 cm/s    LVIDD 3.49 (A) 3.5 - 6.0 cm    IVS 0.74 0.6 - 1.1 cm    PW 0.94 0.6 - 1.1 cm    Ao root annulus 3.07 cm    LVIDS 2.33 2.1 - 4.0 cm    FS 33 28 - 44 %    LA volume 47.72 cm3    Sinus 3.14 cm    STJ 2.73 cm    LV mass 80.21 g    LA size 3.50 cm    RVDD 2.31 cm    TAPSE 2.18 cm    Left Ventricle Relative Wall Thickness 0.54 cm    AV mean gradient 6 mmHg    AV valve area 3.15 cm2    AV Velocity Ratio 0.84     AV index (prosthetic) 0.79     E/A ratio 0.83     E wave decelartion time 199.91 msec    IVRT 0.08 msec    LVOT diameter 2.26 cm    LVOT area 4.0 cm2    LVOT peak juan 1.29 m/s    LVOT peak VTI 22.77 cm    Ao peak juan 1.54 m/s    Ao VTI 28.94 cm    LVOT stroke volume 91.30 cm3    AV peak gradient 9 mmHg    MV Peak E Juan 1.07 m/s    TR Max Juan 3.50 m/s    MV Peak A Juan 1.29 m/s    LV Systolic Volume 18.81 mL    LV Systolic Volume Index 13.6 mL/m2    LV Diastolic Volume 50.59 mL    LV Diastolic Volume Index 36.52 mL/m2    LA Volume Index 34.5 mL/m2    LV Mass Index 58 g/m2    RA Major Axis 3.94 cm    Left Atrium Minor Axis 4.76 cm    Left Atrium Major Axis 3.96 cm    Triscuspid Valve Regurgitation Peak Gradient 49 mmHg    RA Width 2.98 cm    Right Atrial Pressure (from IVC) 3 mmHg    TV rest pulmonary artery pressure 52 mmHg    Narrative    · Normal left ventricular  systolic function. The estimated ejection   fraction is 65%  · Indeterminate left ventricular diastolic function.  · Normal right ventricular systolic function.  · Mild left atrial enlargement.  · Normal central venous pressure (3 mm Hg).  · The estimated PA systolic pressure is 52 mm Hg  · Pulmonary hypertension present.          Pending Diagnostic Studies:     None         Medications:  Reconciled Home Medications:      Medication List      START taking these medications    albuterol-ipratropium 2.5 mg-0.5 mg/3 mL nebulizer solution  Commonly known as:  DUO-NEB  Take 3 mLs by nebulization every 6 (six) hours while awake. Rescue     ALPRAZolam 0.5 MG tablet  Commonly known as:  XANAX  Take 1 tablet (0.5 mg total) by mouth 2 (two) times daily as needed for Anxiety.     docusate sodium 100 MG capsule  Commonly known as:  COLACE  Take 1 capsule (100 mg total) by mouth 2 (two) times daily as needed for Constipation.     oxyCODONE 5 MG immediate release tablet  Commonly known as:  ROXICODONE  Take 1 tablet (5 mg total) by mouth every 6 (six) hours as needed.     polyethylene glycol 17 gram Pwpk  Commonly known as:  GLYCOLAX  Take 17 g by mouth 2 (two) times daily.        CONTINUE taking these medications    benzonatate 100 MG capsule  Commonly known as:  TESSALON     liothyronine 5 MCG Tab  Commonly known as:  CYTOMEL     mirabegron 50 mg Tb24  Commonly known as:  MYRBETRIQ  Take 1 tablet (50 mg total) by mouth once daily.     mirtazapine 15 MG tablet  Commonly known as:  REMERON  Take 7.5 mg by mouth every evening.     multivitamin with minerals tablet  Take by mouth Daily. 1 Tablet Oral Every day     promethazine-dextromethorphan 6.25-15 mg/5 mL Syrp  Commonly known as:  PROMETHAZINE-DM  Take 5 mLs by mouth.        STOP taking these medications    SYNTHROID 25 MCG tablet  Generic drug:  levothyroxine            Indwelling Lines/Drains at time of discharge:   Lines/Drains/Airways          None          Time spent on  the discharge of patient: over 30  minutes  Patient was seen and examined on the date of discharge and determined to be suitable for discharge.         Atul Mcduffie MD  Department of Hospital Medicine  [facility]

## 2019-08-02 NOTE — PROGRESS NOTES
Follow-up Information     Monik Paul III, MD On 8/13/2019.    Specialty:  Orthopedic Surgery  Why:  Outpatient Services Orthopedic Follow-Up Tuesday at 1:45 PM.  Contact information:  [place] [address]  [phone]             [facility].    Specialties:  Nursing Home Agency, SNF Agency  Contact information:  [address]  [place] [id number] [phone]                 PLEASE BRING TO ALL FOLLOW UP APPOINTMENTS:   1) A COPY YOUR DISCHARGE INSTRUCTIONS   2) ALL MEDICINES YOU ARE CURRENTLY TAKING IN THEIR ORIGINAL BOTTLES   3) IDENTIFICATION CARD   4) INSURANCE CARD    **PLEASE ARRIVE 15 MINUTES AHEAD OF SCHEDULED APPOINTMENT TIME   ++PLEASE CALL 24 HOURS IN ADVANCE IF YOU MUST RESCHEDULE YOUR APPOINTMENT DAY AND/OR TIME     [facility] CARE MANAGEMENT WRITTEN DISCHARGE INFORMATION    APPOINTMENTS AND RESOURCES TO HELP YOU MANAGE YOUR CARE AT HOME BASED ON YOUR PREFERENCES:  (If an appointment is not scheduled for you when you leave the hospital, call your doctor to schedule a follow up visit within a week)        Healthy Living Instructions to HELP MANAGE YOUR CARE AT HOME:  Things You are responsible for:  1.    Getting your prescriptions filled   2.    Taking your medications as directed, DO NOT MISS ANY DOSES!  3.    Following the diet and exercise recommended by your doctor  4.    Going to your follow-up doctor appointment. This is important because it allows the doctor to monitor your progress and determine if any changes need to made to your treatment plan.  5. If you have any questions about MANAGING YOUR CARE AT HOME Call the Nurse Care Line for 24/7 Assistance [phone]       Please answer any calls you may receive from [facility]. We want to continue to support you as you manage your healthcare needs. [facility] is happy to have the opportunity to serve you.      Thank you for choosing [facility] for your healthcare needs!  Your [facility] Case Management Team,    Kathryn Ramirez, RN [place]  Registered Nurse Transition Navigator  [phone]

## 2019-08-02 NOTE — PLAN OF CARE
08/02/19 1517   Post-Acute Status   Post-Acute Authorization Placement  (Return to [facility])   Post-Acute Placement Status Set-up Complete

## 2019-08-02 NOTE — PLAN OF CARE
Problem: Physical Therapy Goal  Goal: Physical Therapy Goal  Goals to be met by: 8/15/19     Patient will increase functional independence with mobility by performin. Supine to sit with MInimal Assistance  2. Rolling to Left and Right with Minimal Assistance  3. Sit to stand transfer with Minimal Assistance using RW  4. Bed to chair transfer with Minimal Assistance using Rolling Walker  5. Gait  x50 feet with Minimal Assistance using Rolling Walker and O2  6. Lower extremity exercise program 2 sets x10 reps per handout, with assistance as needed     Outcome: Ongoing (interventions implemented as appropriate)  Pt will benefit from further skilled therapy in order to return to PLOF.

## 2019-08-02 NOTE — NURSING
Pt has remained free from falls, injuries or signs of infection this shift.  Bed is locked and low with call light within reach. Bed alarm on and audible.

## 2019-08-02 NOTE — PLAN OF CARE
Problem: Adult Inpatient Plan of Care  Goal: Plan of Care Review  Outcome: Ongoing (interventions implemented as appropriate)  Pt is on 3lpm, takes mask treatment with no problems. Will continue to give treatments as scheduled.

## 2019-08-02 NOTE — NURSING
Pt.to transfer to [facility]. Report called to Summer Yusuf LPN. Saline lock d/c. Fly.at bedside & aware. Awaiting transportation.

## 2019-08-02 NOTE — PLAN OF CARE
Problem: Occupational Therapy Goal  Goal: Occupational Therapy Goal  Goals to be met by: 8/15/19    Patient will increase functional independence with ADLs by performing:    UE Dressing with Stand-by Assistance.  LE Dressing with Minimal Assistance.  Grooming while standing at sink with Stand-by Assistance.  Supine to sit with Minimal Assistance.  Step transfer with Contact Guard Assistance  Toilet transfer to bedside commode with Contact Guard Assistance.  Upper extremity exercise program x10 reps per handout, with assistance as needed.     Outcome: Ongoing (interventions implemented as appropriate)  Pt is progressing. Pt will benefit from continued skilled OT to address functional deficits.

## 2019-08-03 NOTE — PLAN OF CARE
Problem: Occupational Therapy Goal  Goal: Occupational Therapy Goal  Goals to be met by: 8/15/19    Patient will increase functional independence with ADLs by performing:    UE Dressing with Stand-by Assistance.  LE Dressing with Minimal Assistance.  Grooming while standing at sink with Stand-by Assistance.  Supine to sit with Minimal Assistance.  Step transfer with Contact Guard Assistance  Toilet transfer to bedside commode with Contact Guard Assistance.  Upper extremity exercise program x10 reps per handout, with assistance as needed.     Outcome: Ongoing (interventions implemented as appropriate)  Pt tolerated OT session well with good participation. Pt progressing and will benefit from continued skilled OT to address functional deficits and maximize level of function.

## 2019-08-03 NOTE — PT/OT/SLP PROGRESS
Occupational Therapy   Treatment    Name: Lena Driscoll  MRN: [id number]  Admitting Diagnosis:  Closed comminuted intertrochanteric fracture of left femur  2 Days Post-Op    Recommendations:     Discharge Recommendations: nursing facility, skilled  Discharge Equipment Recommendations:  none  Barriers to discharge:       Assessment:     Lena Driscoll is a 84 y.o. female with a medical diagnosis of Closed comminuted intertrochanteric fracture of left femur.  She presents with the performance deficits affecting function: weakness, impaired endurance, impaired self care skills, impaired functional mobilty, gait instability, impaired balance, decreased coordination, decreased upper extremity function, decreased lower extremity function, decreased safety awareness, pain, decreased ROM, impaired skin, edema, impaired cardiopulmonary response to activity, impaired joint extensibility, orthopedic precautions. Pt tolerated OT session well, required increased time to perform all tasks. Pt progressing slowly toward OT goals and will benefit from continued skilled OT to maximize level of function.    Rehab Prognosis:  Good; patient would benefit from acute skilled OT services to address these deficits and reach maximum level of function.       Plan:     Patient to be seen 6 x/week to address the above listed problems via self-care/home management, therapeutic activities, therapeutic exercises  · Plan of Care Expires: 08/15/19  · Plan of Care Reviewed with: patient, spouse, daughter    Subjective   Pt agreeable to therapy.    Pain/Comfort:  · Pain Rating 1: 6/10  · Location - Side 1: Left  · Location 1: hip  · Pain Addressed 1: Pre-medicate for activity, Reposition, Cessation of Activity, Nurse notified, Distraction  · Pain Rating 2: 6/10    Objective:     Communicated with: Nurse Ballard prior to session.  Patient found HOB elevated with bed alarm, telemetry, oxygen upon OT entry to room.    General Precautions: Standard, fall,  respiratory   Orthopedic Precautions:LLE weight bearing as tolerated   Braces: N/A     Occupational Performance: Pt required max increased time and reassurance for comfort for all therapy tasks. Pt required frequent rest breaks with pursed lip breathing technique during tasks.    Bed Mobility:    · Patient completed Rolling/Turning to Left with  moderate assistance  · Patient completed Rolling/Turning to Right with moderate assistance  · Patient completed Scooting/Bridging with moderate assistance  · Patient completed Supine to Sit with moderate assistance and of 2 persons  · Patient completed Sit to Supine with moderate assistance and of 2 persons     Functional Mobility/Transfers:  · Patient completed Sit <> Stand Transfer with moderate assistance and of 2 persons  with  rolling walker from EOB, and maximal A x 2 persons from chair  · Patient completed Bed > Chair Transfer using Step transfer technique with moderate assistance and of 2 persons with rolling walker   · Patient completed Chair>Bed stand pivot transfer with B HHA and Max A  · Functional Mobility: See PT note    Activities of Daily Living:  · Grooming: stand by assistance and set up A to perform oral care and wash face seated EOB  · Lower Body Dressing: dependence    · Toileting: total assistance for pericare, pt with saturated brief. Able to roll L<>R with mod A     AMPAC 6 Click ADL: 14    Treatment & Education:  Patient performed bed mobility, functional transfers, and ADL's as above.  Pt educated on safety awareness with all OOB mobility and ADL's.    Patient left HOB elevated wit SCDs, pressure relief boots, all lines intact, call button in reach, bed alarm on and nurse notifiedEducation:      GOALS:   Multidisciplinary Problems     Occupational Therapy Goals        Problem: Occupational Therapy Goal    Goal Priority Disciplines Outcome Interventions   Occupational Therapy Goal     OT, PT/OT Ongoing (interventions implemented as appropriate)     Description:  Goals to be met by: 8/15/19    Patient will increase functional independence with ADLs by performing:    UE Dressing with Stand-by Assistance.  LE Dressing with Minimal Assistance.  Grooming while standing at sink with Stand-by Assistance.  Supine to sit with Minimal Assistance.  Step transfer with Contact Guard Assistance  Toilet transfer to bedside commode with Contact Guard Assistance.  Upper extremity exercise program x10 reps per handout, with assistance as needed.                      Time Tracking:     OT Date of Treatment: 08/02/19  OT Start Time: 1219  OT Stop Time: 1330  OT Total Time (min): 71 min    Billable Minutes:Self Care/Home Management 35 (co-tx with PTA)    YAMILA Jama  8/2/2019

## 2019-08-05 NOTE — PT/OT/SLP DISCHARGE
Physical Therapy Discharge Summary    Name: Lena Driscoll  MRN: [id number]   Principal Problem: Closed comminuted intertrochanteric fracture of left femur     Patient Discharged from acute Physical Therapy on 19.  Please refer to prior PT notes for functional status.     Assessment:     Patient appropriate for care in another setting.    Objective:     GOALS:   Multidisciplinary Problems     Physical Therapy Goals        Problem: Physical Therapy Goal    Goal Priority Disciplines Outcome Goal Variances Interventions   Physical Therapy Goal     PT, PT/OT Ongoing (interventions implemented as appropriate)     Description:  Goals to be met by: 8/15/19     Patient will increase functional independence with mobility by performin. Supine to sit with MInimal Assistance  2. Rolling to Left and Right with Minimal Assistance  3. Sit to stand transfer with Minimal Assistance using RW  4. Bed to chair transfer with Minimal Assistance using Rolling Walker  5. Gait  x50 feet with Minimal Assistance using Rolling Walker and O2  6. Lower extremity exercise program 2 sets x10 reps per handout, with assistance as needed                      Reasons for Discontinuation of Therapy Services  Transfer to alternate level of care.      Plan:     Patient Discharged to: [facility].    Pao Euceda, PT  2019

## 2019-08-05 NOTE — PT/OT/SLP DISCHARGE
Occupational Therapy Discharge Summary    Lena Driscoll  MRN: [id number]   Principal Problem: Closed comminuted intertrochanteric fracture of left femur      Patient Discharged from acute Occupational Therapy on 8/2/19.  Please refer to prior OT notes for functional status.    Assessment:      Patient appropriate for care in another setting.    Objective:     GOALS:   Multidisciplinary Problems     Occupational Therapy Goals        Problem: Occupational Therapy Goal    Goal Priority Disciplines Outcome Interventions   Occupational Therapy Goal     OT, PT/OT Ongoing (interventions implemented as appropriate)    Description:  Goals to be met by: 8/15/19    Patient will increase functional independence with ADLs by performing:    UE Dressing with Stand-by Assistance.  LE Dressing with Minimal Assistance.  Grooming while standing at sink with Stand-by Assistance.  Supine to sit with Minimal Assistance.  Step transfer with Contact Guard Assistance  Toilet transfer to bedside commode with Contact Guard Assistance.  Upper extremity exercise program x10 reps per handout, with assistance as needed.                      Reasons for Discontinuation of Therapy Services  Transfer to alternate level of care.      Plan:     Patient Discharged to: Skilled Nursing Facility    Alyssa Holt OT  8/5/2019

## (undated) DEVICE — REAMER STEPPED DHS DCS

## (undated) DEVICE — GAUZE SPONGE 4X4 12PLY

## (undated) DEVICE — BLANKET UPPER BODY 78.7X29.9IN

## (undated) DEVICE — SUT VICRYL PLUS 0 CT1 36IN

## (undated) DEVICE — APPLICATOR CHLORAPREP ORN 26ML

## (undated) DEVICE — MAT QUICK 40X30 FLOOR FLUID LF

## (undated) DEVICE — SUPPORT ULNA NERVE PROTECTOR

## (undated) DEVICE — SHEET DRAPE FAN-FOLDED 3/4

## (undated) DEVICE — STAPLER SKIN PROXIMATE WIDE

## (undated) DEVICE — LINER GLOVE POWDERFREE 8

## (undated) DEVICE — Device

## (undated) DEVICE — SUT VICRYL 2-0 36 CT-1

## (undated) DEVICE — BIT DRILL QC 3FLUTED 4.2X145 S

## (undated) DEVICE — SEE MEDLINE ITEM 152622

## (undated) DEVICE — GLOVE SURGICAL LATEX SZ 7

## (undated) DEVICE — WIRE GUIDE 3.2MM 400MM
Type: IMPLANTABLE DEVICE | Site: HIP | Status: NON-FUNCTIONAL
Removed: 2019-07-31

## (undated) DEVICE — CANISTER SUCTION 2 LTR

## (undated) DEVICE — ELECTRODE REM PLYHSV RETURN 9

## (undated) DEVICE — SEE MEDLINE ITEM 146292

## (undated) DEVICE — SOL 9P NACL IRR PIC IL

## (undated) DEVICE — SEE MEDLINE ITEM 157117

## (undated) DEVICE — SEE MEDLINE ITEM 107746

## (undated) DEVICE — DRESSING XEROFORM FOIL PK 1X8

## (undated) DEVICE — LINER GLOVE POWDERFREE SZ 7

## (undated) DEVICE — DRAPE STERI-DRAPE 83X125 IOBAN